# Patient Record
Sex: FEMALE | Race: WHITE | Employment: UNEMPLOYED | URBAN - METROPOLITAN AREA
[De-identification: names, ages, dates, MRNs, and addresses within clinical notes are randomized per-mention and may not be internally consistent; named-entity substitution may affect disease eponyms.]

---

## 2018-12-27 ENCOUNTER — APPOINTMENT (OUTPATIENT)
Dept: GENERAL RADIOLOGY | Age: 23
End: 2018-12-27
Attending: EMERGENCY MEDICINE
Payer: OTHER MISCELLANEOUS

## 2018-12-27 ENCOUNTER — HOSPITAL ENCOUNTER (EMERGENCY)
Age: 23
Discharge: HOME | End: 2018-12-27
Attending: EMERGENCY MEDICINE
Payer: OTHER MISCELLANEOUS

## 2018-12-27 VITALS
HEIGHT: 66 IN | SYSTOLIC BLOOD PRESSURE: 136 MMHG | BODY MASS INDEX: 25.71 KG/M2 | WEIGHT: 160 LBS | DIASTOLIC BLOOD PRESSURE: 72 MMHG | TEMPERATURE: 98 F | HEART RATE: 78 BPM | OXYGEN SATURATION: 99 % | RESPIRATION RATE: 18 BRPM

## 2018-12-27 DIAGNOSIS — S50.02XA CONTUSION OF LEFT ELBOW, INITIAL ENCOUNTER: ICD-10-CM

## 2018-12-27 DIAGNOSIS — S76.219A GROIN STRAIN, INITIAL ENCOUNTER: ICD-10-CM
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
## 2018-12-27 DIAGNOSIS — S60.212A CONTUSION OF LEFT WRIST, INITIAL ENCOUNTER: ICD-10-CM

## 2018-12-27 PROCEDURE — 73110 X-RAY EXAM OF WRIST: CPT

## 2018-12-27 PROCEDURE — 73080 X-RAY EXAM OF ELBOW: CPT

## 2018-12-27 PROCEDURE — 99282 EMERGENCY DEPT VISIT SF MDM: CPT

## 2018-12-27 PROCEDURE — 74011250637 HC RX REV CODE- 250/637: Performed by: PHYSICIAN ASSISTANT

## 2018-12-27 RX ORDER — METHOCARBAMOL 500 MG/1
500 TABLET, FILM COATED ORAL 4 TIMES DAILY
Qty: 30 TAB | Refills: 0 | Status: SHIPPED | OUTPATIENT
Start: 2018-12-27 | End: 2019-03-07

## 2018-12-27 RX ORDER — METHOCARBAMOL 500 MG/1
500 TABLET, FILM COATED ORAL
Status: COMPLETED | OUTPATIENT
Start: 2018-12-27 | End: 2018-12-27

## 2018-12-27 RX ADMIN — METHOCARBAMOL 500 MG: 500 TABLET, FILM COATED ORAL at 16:52
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
## 2018-12-27 NOTE — LETTER
[address] [place] [address] [facility] 
[address] [facility] [id number] [phone] Work/School Note Date: 12/27/2018 To Whom It May concern: 
 
Gwendel Spatz was seen and treated today in the emergency room by the following provider(s): 
Attending Provider: Debbi Hall MD 
Physician Assistant: Chato Gongora. Gwendel Spatz may return to work on 12/28/18. Sincerely, CHAYO Hdez
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
## 2018-12-27 NOTE — DISCHARGE INSTRUCTIONS
Groin Strain: Care Instructions  Your Care Instructions  A groin strain is an injury that happens when you tear or overstretch (pull) a groin muscle. The groin muscles are in the area on either side of the body in the folds where the belly joins the legs. You can strain a groin muscle during exercise, such as running, skating, kicking in soccer, or playing basketball. It can happen when you lift, push, or pull heavy objects. You might pull a groin muscle when you fall. The injury can range from a minor pull to a more serious tear of the muscle. You may feel pain and tenderness that's worse when you squeeze your legs together. You may also have pain when you raise the knee of the injured side. There may be swelling or bruising in the groin area or inner thigh. If you have a bad strain, you may walk with a limp while it heals. Rest and other home care can help the muscle heal. Healing can take up to 3 weeks or more. Your doctor may want to see you again in 2 to 3 weeks. Follow-up care is a key part of your treatment and safety. Be sure to make and go to all appointments, and call your doctor if you are having problems. It's also a good idea to know your test results and keep a list of the medicines you take. How can you care for yourself at home? · Be safe with medicines. Read and follow all instructions on the label. ? If the doctor gave you a prescription medicine for pain, take it as prescribed. ? If you are not taking a prescription pain medicine, ask your doctor if you can take an over-the-counter medicine. · Rest and protect your injured or sore groin area for 1 to 2 weeks. Stop, change, or take a break from any activity that may be causing your pain or soreness. Do not do intense activities while you still have pain. · Put ice or a cold pack on your groin area for 10 to 20 minutes at a time. Try to do this every 1 to 2 hours for the next 3 days (when you are awake) or until the swelling goes down. Put a thin cloth between the ice and your skin. · After 2 or 3 days, if your swelling is gone, apply heat. Put a warm water bottle, a heating pad set on low, or a warm cloth on your groin area. Do not go to sleep with a heating pad on your skin. · If your doctor gave you crutches, make sure you use them as directed. · Wear snug shorts or underwear that support the injured area. When should you call for help? Call your doctor now or seek immediate medical care if:    · You have new or severe pain or swelling in the groin area.     · Your groin or upper thigh is cool or pale or changes color.     · You have tingling, weakness, or numbness in your groin or leg.     · You cannot move your leg.     · You cannot put weight on your leg.    Watch closely for changes in your health, and be sure to contact your doctor if:    · You do not get better as expected. Where can you learn more? Go to http://eloise-ant.info/. Enter W279 in the search box to learn more about \"Groin Strain: Care Instructions. \"  Current as of: November 29, 2017  Content Version: 11.8  © 3046-6059 [employer]. Care instructions adapted under license by [employer] (which disclaims liability or warranty for this information). If you have questions about a medical condition or this instruction, always ask your healthcare professional. [place] [address] any warranty or liability for your use of this information. Motor Vehicle Accident: Care Instructions  Your Care Instructions    You were seen by a doctor after a motor vehicle accident. Because of the accident, you may be sore for several days. Over the next few days, you may hurt more than you did just after the accident. The doctor has checked you carefully, but problems can develop later. If you notice any problems or new symptoms, get medical treatment right away. Follow-up care is a key part of your treatment and safety. Be sure to make and go to all appointments, and call your doctor if you are having problems. It's also a good idea to know your test results and keep a list of the medicines you take. How can you care for yourself at home? · Keep track of any new symptoms or changes in your symptoms. · Take it easy for the next few days, or longer if you are not feeling well. Do not try to do too much. · Put ice or a cold pack on any sore areas for 10 to 20 minutes at a time to stop swelling. Put a thin cloth between the ice pack and your skin. Do this several times a day for the first 2 days. · Be safe with medicines. Take pain medicines exactly as directed. ? If the doctor gave you a prescription medicine for pain, take it as prescribed. ? If you are not taking a prescription pain medicine, ask your doctor if you can take an over-the-counter medicine. · Do not drive after taking a prescription pain medicine. · Do not do anything that makes the pain worse. · Do not drink any alcohol for 24 hours or until your doctor tells you it is okay. When should you call for help? Call 911 if:    · You passed out (lost consciousness).    Call your doctor now or seek immediate medical care if:    · You have new or worse belly pain.     · You have new or worse trouble breathing.     · You have new or worse head pain.     · You have new pain, or your pain gets worse.     · You have new symptoms, such as numbness or vomiting.    Watch closely for changes in your health, and be sure to contact your doctor if:    · You are not getting better as expected. Where can you learn more? Go to http://eloise-ant.info/. Enter K570 in the search box to learn more about \"Motor Vehicle Accident: Care Instructions. \"  Current as of: November 20, 2017  Content Version: 11.8  © 9985-0228 [employer].  Care instructions adapted under license by [facility] (which disclaims liability or warranty for this information). If you have questions about a medical condition or this instruction, always ask your healthcare professional. [place] [address] any warranty or liability for your use of this information.

## 2019-02-08 ENCOUNTER — OFFICE VISIT (OUTPATIENT)
Dept: INTERNAL MEDICINE CLINIC | Age: 24
End: 2019-02-08

## 2019-02-08 VITALS
HEART RATE: 71 BPM | SYSTOLIC BLOOD PRESSURE: 123 MMHG | TEMPERATURE: 96.3 F | BODY MASS INDEX: 26.84 KG/M2 | RESPIRATION RATE: 18 BRPM | HEIGHT: 66 IN | OXYGEN SATURATION: 100 % | WEIGHT: 167 LBS | DIASTOLIC BLOOD PRESSURE: 77 MMHG

## 2019-02-08 DIAGNOSIS — M25.561 CHRONIC PAIN OF RIGHT KNEE: ICD-10-CM

## 2019-02-08 DIAGNOSIS — Z72.0 TOBACCO ABUSE: ICD-10-CM

## 2019-02-08 DIAGNOSIS — M79.602 LEFT ARM PAIN: Primary | ICD-10-CM

## 2019-02-08 DIAGNOSIS — Z00.00 ROUTINE GENERAL MEDICAL EXAMINATION AT A HEALTH CARE FACILITY: ICD-10-CM

## 2019-02-08 DIAGNOSIS — G89.29 CHRONIC PAIN OF RIGHT KNEE: ICD-10-CM

## 2019-02-08 DIAGNOSIS — R61 HYPERHIDROSIS: ICD-10-CM

## 2019-02-08 DIAGNOSIS — Z76.89 ESTABLISHING CARE WITH NEW DOCTOR, ENCOUNTER FOR: ICD-10-CM
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
## 2019-02-08 NOTE — PATIENT INSTRUCTIONS
Smoking cessation- Resources:  
1. 5-704-[id number] 2. Local [facility][1] Clinics (check with your insurance)- they may offer free classes 7 days a week with no appointment necessary 3. American [employer] Association [place] From Smoking 4. Talk with your provider about medication. Tennis Elbow: Care Instructions Your Care Instructions Tennis elbow is soreness or pain on the outer part of the elbow. The pain occurs when the tendon is stretched and becomes irritated by repeated twisting of the hand, wrist, and forearm. A tendon is a tough tissue that connects muscle to bone. This injury is common in tennis players. But you also can get it from many activities that work the same muscles. Examples include gardening, painting, and using tools. Tennis elbow usually heals with rest and treatment at home. Follow-up care is a key part of your treatment and safety. Be sure to make and go to all appointments, and call your doctor if you are having problems. It's also a good idea to know your test results and keep a list of the medicines you take. How can you care for yourself at home? 
  · Rest your fingers, wrist, and forearm. Try to stop or reduce any activity that causes elbow pain. You may have to rest your arm for weeks to months. Follow your doctor's directions for how long to rest.  
  · Put ice or a cold pack on your elbow for 10 to 20 minutes at a time. Try to do this every 1 to 2 hours for the next 3 days (when you are awake) or until the swelling goes down. Put a thin cloth between the ice and your skin.  
  · If your doctor gave you a brace or splint, use it as directed. A \"counterforce\" brace is a strap around your forearm, just below your elbow. It may ease the pressure on the tendon and spread force throughout your arm.  
  · Prop up your elbow on pillows to help reduce swelling.  
  · Follow your doctor's or physical therapist's directions for exercise.  
  · Return to your usual activities slowly.   · Try to prevent the problem. Learn the best techniques for your sport. For example, make sure the  on your tennis racquet is not too big for your hand. Try not to hit a tennis ball late in your swing.  
  · Think about asking your employer about new ways of doing your job if your elbow pain is caused by something you do at work. Medicines 
  · Be safe with medicines. Read and follow all instructions on the label. ? If the doctor gave you a prescription medicine for pain, take it as prescribed. ? If you are not taking a prescription pain medicine, ask your doctor if you can take an over-the-counter medicine. When should you call for help? Call your doctor now or seek immediate medical care if: 
  · Your pain is worse.  
  · You cannot bend your elbow normally.  
  · Your arm or hand is cool or pale or changes color.  
  · You have tingling, weakness, or numbness in your hand and fingers.  
 Watch closely for changes in your health, and be sure to contact your doctor if: 
  · You have work problems caused by your elbow pain.  
  · Your pain is not better after 2 weeks. Where can you learn more? Go to http://leoise-ant.info/. Enter 0699 465 17 25 in the search box to learn more about \"Tennis Elbow: Care Instructions. \" Current as of: September 20, 2018 Content Version: 11.9 © 4388-2306 [employer]. Care instructions adapted under license by [employer] (which disclaims liability or warranty for this information). If you have questions about a medical condition or this instruction, always ask your healthcare professional. [place] [address] any warranty or liability for your use of this information. Tennis Elbow: Care Instructions Your Care Instructions Tennis elbow is soreness or pain on the outer part of the elbow.  The pain occurs when the tendon is stretched and becomes irritated by repeated twisting of the hand, wrist, and forearm. A tendon is a tough tissue that connects muscle to bone. This injury is common in tennis players. But you also can get it from many activities that work the same muscles. Examples include gardening, painting, and using tools. Tennis elbow usually heals with rest and treatment at home. Follow-up care is a key part of your treatment and safety. Be sure to make and go to all appointments, and call your doctor if you are having problems. It's also a good idea to know your test results and keep a list of the medicines you take. How can you care for yourself at home? 
  · Rest your fingers, wrist, and forearm. Try to stop or reduce any activity that causes elbow pain. You may have to rest your arm for weeks to months. Follow your doctor's directions for how long to rest.  
  · Put ice or a cold pack on your elbow for 10 to 20 minutes at a time. Try to do this every 1 to 2 hours for the next 3 days (when you are awake) or until the swelling goes down. Put a thin cloth between the ice and your skin.  
  · If your doctor gave you a brace or splint, use it as directed. A \"counterforce\" brace is a strap around your forearm, just below your elbow. It may ease the pressure on the tendon and spread force throughout your arm.  
  · Prop up your elbow on pillows to help reduce swelling.  
  · Follow your doctor's or physical therapist's directions for exercise.  
  · Return to your usual activities slowly.  
  · Try to prevent the problem. Learn the best techniques for your sport. For example, make sure the  on your tennis racquet is not too big for your hand. Try not to hit a tennis ball late in your swing.  
  · Think about asking your employer about new ways of doing your job if your elbow pain is caused by something you do at work. Medicines 
  · Be safe with medicines. Read and follow all instructions on the label. ? If the doctor gave you a prescription medicine for pain, take it as prescribed. ? If you are not taking a prescription pain medicine, ask your doctor if you can take an over-the-counter medicine. When should you call for help? Call your doctor now or seek immediate medical care if: 
  · Your pain is worse.  
  · You cannot bend your elbow normally.  
  · Your arm or hand is cool or pale or changes color.  
  · You have tingling, weakness, or numbness in your hand and fingers.  
 Watch closely for changes in your health, and be sure to contact your doctor if: 
  · You have work problems caused by your elbow pain.  
  · Your pain is not better after 2 weeks. Where can you learn more? Go to http://eloise-ant.info/. Enter 0699 465 [id number] 25 in the search box to learn more about \"Tennis Elbow: Care Instructions. \" Current as of: September 20, 2018 Content Version: 11.9 © 0248-5505 [employer]. Care instructions adapted under license by [employer] (which disclaims liability or warranty for this information). If you have questions about a medical condition or this instruction, always ask your healthcare professional. [place] [address] any warranty or liability for your use of this information.

## 2019-02-09 LAB
ALBUMIN SERPL-MCNC: 4.6 G/DL (ref 3.5–5.5)
ALBUMIN/GLOB SERPL: 1.6 {RATIO} (ref 1.2–2.2)
ALP SERPL-CCNC: 90 IU/L (ref 39–117)
ALT SERPL-CCNC: 11 IU/L (ref 0–32)
APPEARANCE UR: CLEAR
AST SERPL-CCNC: 15 IU/L (ref 0–40)
BACTERIA #/AREA URNS HPF: ABNORMAL /[HPF]
BASOPHILS # BLD AUTO: 0 X10E3/UL (ref 0–0.2)
BASOPHILS NFR BLD AUTO: 1 %
BILIRUB SERPL-MCNC: 0.2 MG/DL (ref 0–1.2)
BILIRUB UR QL STRIP: NEGATIVE
BUN SERPL-MCNC: 10 MG/DL (ref 6–20)
BUN/CREAT SERPL: 16 (ref 9–23)
CALCIUM SERPL-MCNC: 9.3 MG/DL (ref 8.7–10.2)
CASTS URNS QL MICRO: ABNORMAL /LPF
CHLORIDE SERPL-SCNC: 105 MMOL/L (ref 96–106)
CO2 SERPL-SCNC: 22 MMOL/L (ref 20–29)
COLOR UR: YELLOW
CREAT SERPL-MCNC: 0.62 MG/DL (ref 0.57–1)
EOSINOPHIL # BLD AUTO: 0.4 X10E3/UL (ref 0–0.4)
EOSINOPHIL NFR BLD AUTO: 6 %
EPI CELLS #/AREA URNS HPF: ABNORMAL /HPF
ERYTHROCYTE [DISTWIDTH] IN BLOOD BY AUTOMATED COUNT: 13.5 % (ref 12.3–15.4)
GLOBULIN SER CALC-MCNC: 2.9 G/DL (ref 1.5–4.5)
GLUCOSE SERPL-MCNC: 88 MG/DL (ref 65–99)
GLUCOSE UR QL: NEGATIVE
HCT VFR BLD AUTO: 44.1 % (ref 34–46.6)
HGB BLD-MCNC: 14.4 G/DL (ref 11.1–15.9)
HGB UR QL STRIP: ABNORMAL
IMM GRANULOCYTES # BLD AUTO: 0 X10E3/UL (ref 0–0.1)
IMM GRANULOCYTES NFR BLD AUTO: 0 %
KETONES UR QL STRIP: NEGATIVE
LEUKOCYTE ESTERASE UR QL STRIP: NEGATIVE
LYMPHOCYTES # BLD AUTO: 2.3 X10E3/UL (ref 0.7–3.1)
LYMPHOCYTES NFR BLD AUTO: 36 %
MCH RBC QN AUTO: 29.8 PG (ref 26.6–33)
MCHC RBC AUTO-ENTMCNC: 32.7 G/DL (ref 31.5–35.7)
MCV RBC AUTO: 91 FL (ref 79–97)
MICRO URNS: ABNORMAL
MONOCYTES # BLD AUTO: 0.5 X10E3/UL (ref 0.1–0.9)
MONOCYTES NFR BLD AUTO: 8 %
NEUTROPHILS # BLD AUTO: 3.1 X10E3/UL (ref 1.4–7)
NEUTROPHILS NFR BLD AUTO: 49 %
NITRITE UR QL STRIP: NEGATIVE
PH UR STRIP: 7.5 [PH] (ref 5–7.5)
PLATELET # BLD AUTO: 332 X10E3/UL (ref 150–379)
POTASSIUM SERPL-SCNC: 4.8 MMOL/L (ref 3.5–5.2)
PROT SERPL-MCNC: 7.5 G/DL (ref 6–8.5)
PROT UR QL STRIP: NEGATIVE
RBC # BLD AUTO: 4.84 X10E6/UL (ref 3.77–5.28)
RBC #/AREA URNS HPF: ABNORMAL /HPF
SODIUM SERPL-SCNC: 144 MMOL/L (ref 134–144)
SP GR UR: 1.02 (ref 1–1.03)
TSH SERPL DL<=0.005 MIU/L-ACNC: 1.72 UIU/ML (ref 0.45–4.5)
UROBILINOGEN UR STRIP-MCNC: 0.2 MG/DL (ref 0.2–1)
WBC # BLD AUTO: 6.3 X10E3/UL (ref 3.4–10.8)
WBC #/AREA URNS HPF: ABNORMAL /HPF

## 2019-02-11 ENCOUNTER — TELEPHONE (OUTPATIENT)
Dept: INTERNAL MEDICINE CLINIC | Age: 24
End: 2019-02-11

## 2019-02-11 NOTE — TELEPHONE ENCOUNTER
Patient returned call and I advised her of the message below. Patient understood and provided the [facility] to [facility]. Closing referral to orthopedics.

## 2019-02-11 NOTE — TELEPHONE ENCOUNTER
Called patient to give results and instructions to take Naproxen BID for a couple of days. Patient states verbal understanding and agrees to hydrate.

## 2019-02-11 NOTE — TELEPHONE ENCOUNTER
Received call from [facility] they do not accept the patients insurance. Spoke with NP Rios Dinero and she feels that the patient she do the physical therapy first and then go from there. If the patient is persistent and wants to see a orthopedic than we can send to [place]. Tried to call patient and left a message for patient to return call.

## 2019-02-11 NOTE — TELEPHONE ENCOUNTER
----- Message from Shandra Everett NP sent at 2/11/2019 10:40 AM EST -----  Labs are normal except for trace blood and some bacteria in the urine however you were without urinary symptoms on examination so no need for treatment at this time just make sure to hydrate well.

## 2019-02-11 NOTE — PROGRESS NOTES
Labs are normal except for trace blood and some bacteria in the urine however you were without urinary symptoms on examination so no need for treatment at this time just make sure to hydrate well.

## 2019-02-13 ENCOUNTER — TELEPHONE (OUTPATIENT)
Dept: INTERNAL MEDICINE CLINIC | Age: 24
End: 2019-02-13

## 2019-02-13 NOTE — TELEPHONE ENCOUNTER
Patient called today 2/13/19 stating that she is now feeling burning sensation when she urinate.  Please advice [phone]

## 2019-02-14 ENCOUNTER — TELEPHONE (OUTPATIENT)
Dept: INTERNAL MEDICINE CLINIC | Age: 24
End: 2019-02-14

## 2019-02-14 NOTE — TELEPHONE ENCOUNTER
Patient contacted, patient identified with two identifiers (Name & ). Patient denies any dysuria, or hematuria. Patient given instructions per JOSE Sung, and verbalizes understanding.

## 2019-02-27 ENCOUNTER — HOSPITAL ENCOUNTER (OUTPATIENT)
Dept: PHYSICAL THERAPY | Age: 24
Discharge: HOME | End: 2019-02-27
Payer: MEDICAID

## 2019-02-27 PROCEDURE — 97162 PT EVAL MOD COMPLEX 30 MIN: CPT

## 2019-02-27 PROCEDURE — 97140 MANUAL THERAPY 1/> REGIONS: CPT

## 2019-02-27 PROCEDURE — 97110 THERAPEUTIC EXERCISES: CPT
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
## 2019-02-27 NOTE — PROGRESS NOTES
PT DAILY TREATMENT NOTE 10-18    Patient Name: Popeye Patient Hand  Date:2019  : 1995  [x]  Patient  Verified  Payor: BLUE CROSS MEDICAID / Plan: [address] [place] [address] / Product Type: Managed Care Medicaid /    In time:2:45  Out time:3:30  Total Treatment Time (min): 45  Visit #: 1 of 10      Medicare/BCBS Only   Total Timed Codes (min):  23 1:1 Treatment Time:  45       Treatment Area: Pain in right knee [M25.561]  Pain in left arm [M79.602]     SUBJECTIVE  Pain Level (0-10 scale): 1-2/10  Any medication changes, allergies to medications, adverse drug reactions, diagnosis change, or new procedure performed?: [x] No    [] Yes (see summary sheet for update)  Subjective functional status/changes:   [x] See paper initial evaluation form in patient chart.       OBJECTIVE    22 min [x]Eval                  []Re-Eval     13 min Therapeutic Exercise:  [] See flow sheet : HEP given and reviewed with Pt; postural re-education   Rationale: increase ROM and increase strength to improve the patients ability to improve left elbow pain with tasks; reduce pain with standing/amb    10 min Manual Therapy:  MET leg pull to correct right ilial upslip; MET to correct right posteriorly rotated innominate   Rationale: decrease pain and increase ROM to reduce pain with amb    With   [x] TE   [] TA   [] neuro   [] other: Patient Education: [x] Review HEP    [] Progressed/Changed HEP based on:   [] positioning   [] body mechanics   [] transfers   [] heat/ice application    [] other:      Other Objective/Functional Measures: FOTO 56 pts     Pain Level (0-10 scale) post treatment: 1/10    ASSESSMENT/Changes in Function:     Patient will continue to benefit from skilled PT services to address functional mobility deficits, address ROM deficits, address strength deficits, analyze and address soft tissue restrictions, analyze and cue movement patterns, analyze and modify body mechanics/ergonomics, assess and modify postural abnormalities, address imbalance/dizziness and instruct in home and community integration to attain remaining goals.      [x]  See Plan of Care  []  See progress note/recertification  []  See Discharge Summary         PLAN  []  Upgrade activities as tolerated     [x]  Continue plan of care  []  Update interventions per flow sheet       []  Discharge due to:_  []  Other:_      Josefina Goyal, PT 2/27/2019  5:27 PM

## 2019-03-01 ENCOUNTER — HOSPITAL ENCOUNTER (OUTPATIENT)
Dept: PHYSICAL THERAPY | Age: 24
Discharge: HOME | End: 2019-03-01
Payer: MEDICAID

## 2019-03-01 PROCEDURE — 97035 APP MDLTY 1+ULTRASOUND EA 15: CPT

## 2019-03-01 PROCEDURE — 97140 MANUAL THERAPY 1/> REGIONS: CPT

## 2019-03-01 PROCEDURE — 97110 THERAPEUTIC EXERCISES: CPT

## 2019-03-01 NOTE — PROGRESS NOTES
PT DAILY TREATMENT NOTE 10-18    Patient Name: Sohail Bansal  Date:3/1/2019  : 1995  [x]  Patient  Verified  Payor: BLUE CROSS MEDICAID / Plan: [facility]leg[facility] / Product Type: Managed Care Medicaid /    In time:11:24  Out time:12:32  Total Treatment Time (min): 68  Visit #: 2 of 10    Medicare/BCBS Only   Total Timed Codes (min):  58 1:1 Treatment Time:  62       Treatment Area: Pain in right knee [M25.561]  Pain in left arm [M79.602]    SUBJECTIVE  Pain Level (0-10 scale): 0 \"sore\" in left elbow and right knee  Any medication changes, allergies to medications, adverse drug reactions, diagnosis change, or new procedure performed?: [x] No    [] Yes (see summary sheet for update)  Subjective functional status/changes:   [] No changes reported  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 Pt reports soreness in the left elbow and right knee today. Pt states that the knee felt better after the manual last session.      OBJECTIVE    Modality rationale: decrease inflammation and decrease pain to improve the patients ability to tolerate ADLs   Min Type Additional Details    [] Estim:  []Unatt       []IFC  []Premod                        []Other:  []w/ice   []w/heat  Position:  Location:    [] Estim: []Att    []TENS instruct  []NMES                    []Other:  []w/US   []w/ice   []w/heat  Position:  Location:    []  Traction: [] Cervical       []Lumbar                       [] Prone          []Supine                       []Intermittent   []Continuous Lbs:  [] before manual  [] after manual   10 (5 mins on right knee and 5 mins on left elbow) [x]  Ultrasound: []Continuous   [x] Pulsed                           []1MHz   [x]3MHz  W/cm2: 1.0  Location: right knee and left elbow    []  Iontophoresis with dexamethasone         Location: [] Take home patch   [] In clinic   10 [x]  Ice     []  heat  []  Ice massage  []  Laser []  Anodyne Position: supine  Location: right knee    []  Laser with stim  []  Other:  Position:  Location:    []  Vasopneumatic Device Pressure:       [] lo [] med [] hi   Temperature: [] lo [] med [] hi   [] Skin assessment post-treatment:  []intact []redness- no adverse reaction    []redness - adverse reaction:     33 min Therapeutic Exercise:  [x] See flow sheet :   Rationale: increase ROM and increase strength to improve the patients ability to tolerate ADLs    15 min Manual Therapy:  MET leg pull to correct right innominate upslip; left elbow distraction, left elbow distraction with anterior and posterior grade 1-2 glides   Rationale: decrease pain, increase ROM and increase tissue extensibility to improve activity tolerance          With   [] TE   [] TA   [] neuro   [] other: Patient Education: [x] Review HEP    [] Progressed/Changed HEP based on:   [] positioning   [] body mechanics   [] transfers   [] heat/ice application    [] other:      Other Objective/Functional Measures: Initiated exercises/interventions in flow sheet. Pelvic alignment and leg length WNL. Pain Level (0-10 scale) post treatment: 1 right knee, 3 left medial/proximal forearm    ASSESSMENT/Changes in Function: Reported pain in the right patellar fat pad region with 1 rep of the right hip IR part of the clamsx3 exercise with the green tband. Held the rest of this exercise on the right. Pt reported some improvement in this pain post session. Pt denied ice on the left elbow but stated she felt some pain in the left medial/proximal forearm post session. Educated pt that she can try ice at home to help with the pain and symptoms for 15-20 mins. Educated pt to be mindful of her posture and positioning while performing work activities/driving. Educated pt that she can roll up a towel to use as a lumbar roll while driving to improve posture. Continue POC as tolerated.      Patient will continue to benefit from skilled PT services to modify and progress therapeutic interventions, address functional mobility deficits, address ROM deficits, address strength deficits, analyze and address soft tissue restrictions, analyze and cue movement patterns, analyze and modify body mechanics/ergonomics, address imbalance/dizziness and instruct in home and community integration to attain remaining goals. []  See Plan of Care  []  See progress note/recertification  []  See Discharge Summary         Progress towards goals / Updated goals:  Short Term Goals: To be accomplished in 1 weeks:  Goal: Pt to be compliant with initial HEP to improve left forearm symptoms and right LE strength. Status at last note/certification: Established and reviewed with Pt  Current: reports no problems with the HEP. 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 Status at last note/certification: 4/5 grossly  Goal: Pt to report < 5/10 pain at worst in right knee to work full work shifts without increased difficulty. Status at last note/certification: 8/90 pain at worst  Goal: Pt to report only 1-2 episodes of right knee locking with change in positions weekly to reduce pain with gait. Status at last note/certification: locking multiple times daily  Goal: Pt to report FOTO score of 71 pts to show improved function and quality of life.   Status at last note/certification: FOTO 56 pts    PLAN  [x]  Upgrade activities as tolerated     [x]  Continue plan of care  [x]  Update interventions per flow sheet       []  Discharge due to:_  []  Other:_      Ivan Montero, PT 3/1/2019  11:50 AM    Future Appointments   Date Time Provider Lucita Chen   3/1/2019 11:15 AM Sherman Clark, PT [facility] [address]   3/5/2019 12:00 PM Yamileth Magana [address]   3/8/2019 11:15 AM evon [facility]N [address]   3/12/2019 11:15 AM [facility] KEENAN [address]   3/15/2019 11:15 AM [facility] KEENAN [address]   3/19/2019 10:30 AM [facility] KEENAN [address]   3/22/2019 11:15 AM Jonathan Goyal, [facility] REE [address]   3/26/2019 11:15 AM evon [facility] K[facility] [address]   3/29/2019 11:15 AM [place], [address]

## 2019-03-04 ENCOUNTER — TELEPHONE (OUTPATIENT)
Dept: INTERNAL MEDICINE CLINIC | Age: 24
End: 2019-03-04

## 2019-03-04 NOTE — TELEPHONE ENCOUNTER
Patient calling and stated that she took benadryl last night thinking it was allergies, however when she woke up this morning she has a stuffy nose and a swollen throat. Patient denies chills and unable to check if she has a fever due to no thermometer. Patient denies trying any other medications besides benadryl. Advised patient I recommend trying some cold medication over the counter, due to we do not have any open appointments and if she still doesn't feel good in a few days I recommend going to either an urgent care or to the emergency room. Patient understood.

## 2019-03-05 ENCOUNTER — HOSPITAL ENCOUNTER (OUTPATIENT)
Dept: PHYSICAL THERAPY | Age: 24
Discharge: HOME | End: 2019-03-05
Payer: MEDICAID

## 2019-03-05 PROCEDURE — 97035 APP MDLTY 1+ULTRASOUND EA 15: CPT

## 2019-03-05 PROCEDURE — 97110 THERAPEUTIC EXERCISES: CPT

## 2019-03-05 PROCEDURE — 97140 MANUAL THERAPY 1/> REGIONS: CPT

## 2019-03-05 NOTE — PROGRESS NOTES
PT DAILY TREATMENT NOTE 10-18    Patient Name: Venessa Salazar  Date:3/5/2019  : 1995  [x]  Patient  Verified  Payor: BLUE CROSS MEDICAID / Plan: [facility] [employer] / Product Type: Managed Care Medicaid /    In time:12;05  Out time: 12;55  Total Treatment Time (min): 50  Visit #: 3 of 10    Medicare/BCBS Only   Total Timed Codes (min):  50 1:1 Treatment Time:  50       Treatment Area: Pain in right knee [M25.561]  Pain in left arm [M79.602]    SUBJECTIVE  Pain Level (0-10 scale):  0  Any medication changes, allergies to medications, adverse drug reactions, diagnosis change, or new procedure performed?: [x] No    [] Yes (see summary sheet for update)  Subjective functional status/changes:   [] No changes reported  The pain hasn't been constant since starting PT    OBJECTIVE    Modality rationale: decrease inflammation, decrease pain and increase tissue extensibility to improve the patients ability to improve ease of walking, UE function for carrying   Min Type Additional Details    [] Estim:  []Unatt       []IFC  []Premod                        []Other:  []w/ice   []w/heat  Position:  Location:    [] Estim: []Att    []TENS instruct  []NMES                    []Other:  []w/US   []w/ice   []w/heat  Position:  Location:    []  Traction: [] Cervical       []Lumbar                       [] Prone          []Supine                       []Intermittent   []Continuous Lbs:  [] before manual  [] after manual   5minutes x 2 [x]  Ultrasound: []Continuous   [x] Pulsed                           []1MHz   [x]3MHz W/cm2: 1.0  Location: left medial elbow,.  Right knee (fat pad)    []  Iontophoresis with dexamethasone         Location: [] Take home patch   [] In clinic    []  Ice     []  heat  []  Ice massage  []  Laser   []  Anodyne Position:  Location:    []  Laser with stim  []  Other:  Position:  Location:    []  Vasopneumatic Device Pressure:       [] lo [] med [] hi   Temperature: [] lo [] med [] hi   [x] Skin assessment post-treatment:  [x]intact []redness- no adverse reaction    []redness - adverse reaction:         25 min Therapeutic Exercise:  [] See flow sheet :   Rationale: increase ROM and increase strength to improve the patients ability to improve ability to perform daily and job tasks    15 min Manual Therapy:  STM, TPR to wrist flexors, distal triceps   Rationale: decrease pain, increase tissue extensibility and decrease trigger points to improve ease of carrying, ADL's, reduce tingling       With   - TE   - TA   - neuro   - other: Patient Education: - Review HEP    - Progressed/Changed HEP based on:   - positioning   - body mechanics   - transfers   - heat/ice application    - other:      Other Objective/Functional Measures: Added prone UE   PD ice    Pain Level (0-10 scale) post treatment: 0    ASSESSMENT/Changes in Function: palpable taut bands throughout the wrist flexors. Pt reporting no pain or symptoms in wrists, forearm aftert MT    Patient will continue to benefit from skilled PT services to modify and progress therapeutic interventions, address functional mobility deficits, address ROM deficits, address strength deficits, analyze and address soft tissue restrictions, analyze and cue movement patterns, analyze and modify body mechanics/ergonomics, assess and modify postural abnormalities, address imbalance/dizziness and instruct in home and community integration to attain remaining goals. []  See Plan of Care  []  See progress note/recertification  []  See Discharge Summary         Progress towards goals / Updated goals:  Goal: Pt to be compliant with initial HEP to improve left forearm symptoms and right LE strength. Status at last note/certification: Established and reviewed with Pt  Current: reports no problems with the HEP.    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 Status at last note/certification: limited a lot in performing per Pt report  Goal: Pt to increase right hip strength to 5/5 grossly to negotiate stairs without increased difficulty or pain for ease with job duties. Status at last note/certification: 4/5 grossly  Goal: Pt to report < 5/10 pain at worst in right knee to work full work shifts without increased difficulty. Status at last note/certification: 7/46 pain at worst  Goal: Pt to report only 1-2 episodes of right knee locking with change in positions weekly to reduce pain with gait. Status at last note/certification: locking multiple times daily  Goal: Pt to report FOTO score of 71 pts to show improved function and quality of life.   Status at last note/certification: FOTO 56 pts    PLAN  [x]  Upgrade activities as tolerated     []  Continue plan of care  []  Update interventions per flow sheet       []  Discharge due to:_  []  Other:_      Andrade Ramon, PTA 3/5/2019  12:00 PM    Future Appointments   Date Time Provider Lucita Chen   3/8/2019 11:15 AM Selam Monet [facility]   3/12/2019 11:15 AM [place], [address]   3/15/2019 11:15 AM [place], [address] [place] [address]   3/19/2019 10:30 AM [facility] KEENAN [facility]   3/22/2019 11:15 AM Ajay Goyal [facility] KEENAN [facility]   3/26/2019 11:15 AM [facility] REE [facility]   3/29/2019 11:15 AM [place], [address] [place] [address]

## 2019-03-07 ENCOUNTER — HOSPITAL ENCOUNTER (EMERGENCY)
Age: 24
Discharge: HOME | End: 2019-03-07
Attending: EMERGENCY MEDICINE | Admitting: EMERGENCY MEDICINE
Payer: MEDICAID

## 2019-03-07 VITALS
WEIGHT: 165 LBS | BODY MASS INDEX: 26.52 KG/M2 | TEMPERATURE: 98.6 F | HEART RATE: 87 BPM | DIASTOLIC BLOOD PRESSURE: 86 MMHG | RESPIRATION RATE: 16 BRPM | SYSTOLIC BLOOD PRESSURE: 135 MMHG | OXYGEN SATURATION: 100 % | HEIGHT: 66 IN

## 2019-03-07 DIAGNOSIS — R10.2 VAGINAL PAIN: Primary | ICD-10-CM

## 2019-03-07 LAB
APPEARANCE UR: CLEAR
BILIRUB UR QL: NEGATIVE
COLOR UR: YELLOW
EPITH CASTS URNS QL MICRO: NORMAL /LPF (ref 0–5)
GLUCOSE UR STRIP.AUTO-MCNC: NEGATIVE MG/DL
HCG UR QL: NEGATIVE
HGB UR QL STRIP: ABNORMAL
KETONES UR QL STRIP.AUTO: NEGATIVE MG/DL
LEUKOCYTE ESTERASE UR QL STRIP.AUTO: ABNORMAL
NITRITE UR QL STRIP.AUTO: NEGATIVE
PH UR STRIP: 7 [PH] (ref 5–8)
PROT UR STRIP-MCNC: NEGATIVE MG/DL
RBC #/AREA URNS HPF: NORMAL /HPF (ref 0–5)
SP GR UR REFRACTOMETRY: 1.02 (ref 1–1.03)
UROBILINOGEN UR QL STRIP.AUTO: 0.2 EU/DL (ref 0.2–1)
WBC URNS QL MICRO: NORMAL /HPF (ref 0–4)

## 2019-03-07 PROCEDURE — 81025 URINE PREGNANCY TEST: CPT

## 2019-03-07 PROCEDURE — 81001 URINALYSIS AUTO W/SCOPE: CPT

## 2019-03-07 PROCEDURE — 99284 EMERGENCY DEPT VISIT MOD MDM: CPT

## 2019-03-07 NOTE — DISCHARGE INSTRUCTIONS
Patient Education        Pelvic Pain: Care Instructions  Your Care Instructions    Pelvic pain, or pain in the lower belly, can have many causes. Often pelvic pain is not serious and gets better in a few days. If your pain continues or gets worse, you may need tests and treatment. Tell your doctor about any new symptoms. These may be signs of a serious problem. Follow-up care is a key part of your treatment and safety. Be sure to make and go to all appointments, and call your doctor if you are having problems. It's also a good idea to know your test results and keep a list of the medicines you take. How can you care for yourself at home? · Rest until you feel better. Lie down, and raise your legs by placing a pillow under your knees. · Drink plenty of fluids. You may find that small, frequent sips are easier on your stomach than if you drink a lot at once. Avoid drinks with carbonation or caffeine, such as soda pop, tea, or coffee. · Try eating several small meals instead of 2 or 3 large ones. Eat mild foods, such as rice, dry toast or crackers, bananas, and applesauce. Avoid fatty and spicy foods, other fruits, and alcohol until 48 hours after your symptoms have gone away. · Take an over-the-counter pain medicine, such as acetaminophen (Tylenol), ibuprofen (Advil, Motrin), or naproxen (Aleve). Read and follow all instructions on the label. · Do not take two or more pain medicines at the same time unless the doctor told you to. Many pain medicines have acetaminophen, which is Tylenol. Too much acetaminophen (Tylenol) can be harmful. · You can put a heating pad, a warm cloth, or moist heat on your belly to relieve pain. When should you call for help? Call your doctor now or seek immediate medical care if:    · You have a new or higher fever.     · You have unusual vaginal bleeding.     · You have new or worse belly or pelvic pain.     · You have vaginal discharge that has increased in amount or smells bad.  Watch closely for changes in your health, and be sure to contact your doctor if:    · You do not get better as expected. Where can you learn more? Go to http://eloise-ant.info/. Enter [phone] in the search box to learn more about \"Pelvic Pain: Care Instructions. \"  Current as of: May 14, 2018  Content Version: 11.9  © 7276-4013 [employer]. Care instructions adapted under license by [employer] (which disclaims liability or warranty for this information). If you have questions about a medical condition or this instruction, always ask your healthcare professional. [address] any warranty or liability for your use of this information.

## 2019-03-07 NOTE — ED PROVIDER NOTES
EMERGENCY DEPARTMENT HISTORY AND PHYSICAL EXAM    Date: 3/7/2019  Patient Name: Vazquez Spence    History of Presenting Illness     Chief Complaint   Patient presents with    Menstrual Problem         History Provided By: Patient    Chief Complaint: Pelvic cramping and concern for retained FB in vagina   Duration: Today   Timing:  Acute  Location: pelvis and vagina   Quality: Cramping  Severity: 7 out of 10  Modifying Factors: None   Associated Symptoms: none       Additional History (Context): Vazquez Spence is a 27 y.o. female with a history of asthma who presents with concerns for retained FB in vagina. Reports she uses both cups and tampons. Reports she is currently on her menstrual cycle and has had cramps that are mildly out of the norm for her. Reports she thought she only had a cup in, but once feeling the cramps became concerned she may have left a tampon in. Reports she tried to remove any FB herself but was unable to find anything. Denies concerns for vaginal discharge, pregnancy or STI. PCP: García Gross NP        Past History     Past Medical History:  Past Medical History:   Diagnosis Date    Asthma     Knee sprain     Right knee       Past Surgical History:  History reviewed. No pertinent surgical history. Family History:  Family History   Problem Relation Age of Onset    Obesity Mother     Obesity Father     No Known Problems Brother     Obesity Maternal Grandmother     Arthritis-rheumatoid Maternal Grandmother        Social History:  Social History     Tobacco Use    Smoking status: Current Every Day Smoker     Packs/day: 1.00    Smokeless tobacco: Never Used   Substance Use Topics    Alcohol use: Yes     Alcohol/week: 1.8 oz     Types: 3 Glasses of wine per week    Drug use: Yes     Types: Marijuana     Comment: on occasion       Allergies:  No Known Allergies      Review of Systems   Review of Systems   Constitutional: Negative for chills and fever.    HENT: Negative for congestion, rhinorrhea and sore throat. Respiratory: Negative for cough and shortness of breath. Cardiovascular: Negative for chest pain. Gastrointestinal: Negative for abdominal pain, blood in stool, constipation, diarrhea, nausea and vomiting. Genitourinary: Positive for pelvic pain, vaginal bleeding and vaginal pain. Negative for decreased urine volume, dysuria, frequency, hematuria, urgency and vaginal discharge. Musculoskeletal: Negative for back pain and myalgias. Skin: Negative for rash and wound. Neurological: Negative for dizziness and headaches. All other systems reviewed and are negative. All Other Systems Negative  Physical Exam     Vitals:    03/07/19 1743   BP: 135/86   Pulse: 87   Resp: 16   Temp: 98.6 °F (37 °C)   SpO2: 100%   Weight: 74.8 kg (165 lb)   Height: 5' 6\" (1.676 m)     Physical Exam   Constitutional: She is oriented to person, place, and time. She appears well-developed and well-nourished. No distress. HENT:   Head: Normocephalic and atraumatic. Eyes: Conjunctivae are normal.   Neck: Normal range of motion. Neck supple. Cardiovascular: Normal rate, regular rhythm and normal heart sounds. Pulmonary/Chest: Effort normal and breath sounds normal. No respiratory distress. She exhibits no tenderness. Abdominal: Soft. Bowel sounds are normal. She exhibits no distension. There is no tenderness. There is no rebound and no guarding. Genitourinary: There is no tenderness or lesion on the right labia. There is no tenderness or lesion on the left labia. No erythema, tenderness or bleeding in the vagina. No foreign body in the vagina. No signs of injury around the vagina. Vaginal discharge (vaginal bleeding) found. Genitourinary Comments: No evidence of FB. Musculoskeletal: She exhibits no edema or deformity. Neurological: She is alert and oriented to person, place, and time. Skin: Skin is warm and dry. She is not diaphoretic.    Psychiatric: She has a normal mood and affect. Her behavior is normal.   Nursing note and vitals reviewed. Diagnostic Study Results     Labs -     Recent Results (from the past 12 hour(s))   URINALYSIS W/ RFLX MICROSCOPIC    Collection Time: 19  5:55 PM   Result Value Ref Range    Color YELLOW      Appearance CLEAR      Specific gravity 1.016 1.005 - 1.030      pH (UA) 7.0 5.0 - 8.0      Protein NEGATIVE  NEG mg/dL    Glucose NEGATIVE  NEG mg/dL    Ketone NEGATIVE  NEG mg/dL    Bilirubin NEGATIVE  NEG      Blood MODERATE (A) NEG      Urobilinogen 0.2 0.2 - 1.0 EU/dL    Nitrites NEGATIVE  NEG      Leukocyte Esterase TRACE (A) NEG     HCG URINE, QL    Collection Time: 19  5:55 PM   Result Value Ref Range    HCG urine, QL NEGATIVE  NEG     URINE MICROSCOPIC ONLY    Collection Time: 19  5:55 PM   Result Value Ref Range    WBC 0 to 3 0 - 4 /hpf    RBC 4 to 10 0 - 5 /hpf    Epithelial cells 2+ 0 - 5 /lpf       Radiologic Studies -   No orders to display     CT Results  (Last 48 hours)    None        CXR Results  (Last 48 hours)    None            Medical Decision Making   I am the first provider for this patient. I reviewed the vital signs, available nursing notes, past medical history, past surgical history, family history and social history. Vital Signs-Reviewed the patient's vital signs. Pulse Oximetry Analysis -  100 % RA    Records Reviewed: Nursing Notes and Old Medical Records     Procedures: None   Procedures    Provider Notes (Medical Decision Making):     Differential Diagnosis:  Menses, AUB/DUB, , normal pregnancy, Retained FB, UTI     Plan: Will order ua, urine preg, and will do pelvic exam to evaluate for FB. MED RECONCILIATION:  No current facility-administered medications for this encounter. No current outpatient medications on file. Disposition:  Home     DISCHARGE NOTE:   Pt has been reexamined. Patient has no new complaints, changes, or physical findings.   Care plan outlined and precautions discussed. Results of workup were reviewed with the patient. All medications were reviewed with the patient. All of pt's questions and concerns were addressed. Patient was instructed and agrees to follow up with OBGYN, as well as to return to the ED upon further deterioration. Patient is ready to go home. Follow-up Information     Follow up With Specialties Details Why Contact Info    [facility] EMERGENCY DEPT Emergency Medicine  As needed [address] [address]  [place] [address]  In 2 days  [address] [facility]  [phone]          There are no discharge medications for this patient. Diagnosis     Clinical Impression:   1.  Vaginal pain

## 2019-03-07 NOTE — ED TRIAGE NOTES
Patient voices concerns for possible tampon retained in vagina. She states that she is on her menstrual cycle at present.

## 2019-03-07 NOTE — LETTER
[address] [place] [address] Dr [facility] EMERGENCY DEPT 
[address] [facility] [address] [phone] Work/School Note Date: 3/7/2019 To Whom It May concern: 
 
Torsten Landin was seen and treated today in the emergency room by the following provider(s): 
Attending Provider: Angelina Jiang DO Physician Assistant: CHAYO Meehan. Torsten Landin  May return to work on 3/8/19 Sincerely, Jade Eng, [address]

## 2019-03-07 NOTE — ED NOTES
Venessa Salazar is a 27 y.o. female that was discharged in stable condition. The patients diagnosis, condition and treatment were explained to  patient and aftercare instructions were given. The patient verbalized understanding. Patient armband removed and shredded.

## 2019-03-12 ENCOUNTER — HOSPITAL ENCOUNTER (OUTPATIENT)
Dept: PHYSICAL THERAPY | Age: 24
Discharge: HOME | End: 2019-03-12
Payer: MEDICAID

## 2019-03-12 PROCEDURE — 97110 THERAPEUTIC EXERCISES: CPT

## 2019-03-12 PROCEDURE — 97035 APP MDLTY 1+ULTRASOUND EA 15: CPT

## 2019-03-12 PROCEDURE — 97140 MANUAL THERAPY 1/> REGIONS: CPT

## 2019-03-12 NOTE — PROGRESS NOTES
PT DAILY TREATMENT NOTE 10-18    Patient Name: Tracey Nicholas  Date:3/12/2019  : 1995  [x]  Patient  Verified  Payor: BLUE CROSS MEDICAID / Plan: [facility] / Product Type: Managed Care Medicaid /    In time:11;20  Out time:12;10  Total Treatment Time (min): 50  Visit #: 4 of 10    Medicare/BCBS Only   Total Timed Codes (min):  50 1:1 Treatment Time:  40       Treatment Area: Pain in right knee [M25.561]  Pain in left arm [M79.602]    SUBJECTIVE  Pain Level (0-10 scale): 5 arm, 0 knee  Any medication changes, allergies to medications, adverse drug reactions, diagnosis change, or new procedure performed?: [x] No    [] Yes (see summary sheet for update)  Subjective functional status/changes:   [] No changes reported  My arm is really hurting from working last night.     OBJECTIVE    Modality rationale: decrease inflammation, decrease pain and increase tissue extensibility to improve the patients ability to improve ease of lifting carrying, walking   Min Type Additional Details    [] Estim:  []Unatt       []IFC  []Premod                        []Other:  []w/ice   []w/heat  Position:  Location:    [] Estim: []Att    []TENS instruct  []NMES                    []Other:  []w/US   []w/ice   []w/heat  Position:  Location:    []  Traction: [] Cervical       []Lumbar                       [] Prone          []Supine                       []Intermittent   []Continuous Lbs:  [] before manual  [] after manual   5 x 2 [x]  Ultrasound: [x]Continuous   [x] Pulsed                           []1MHz   [x]3MHz W/cm2:  .8-1.0  Location: right knee fat pad, left distal tricep insertion, wrist extensors    []  Iontophoresis with dexamethasone         Location: [] Take home patch   [] In clinic    []  Ice     []  heat  []  Ice massage  []  Laser   []  Anodyne Position:  Location:    []  Laser with stim  []  Other:  Position:  Location:    []  Vasopneumatic Device Pressure:       [] lo [] med [] hi Temperature: [] lo [] med [] hi   [x] Skin assessment post-treatment:  [x]intact []redness- no adverse reaction    []redness - adverse reaction:       30 min Therapeutic Exercise:  [] See flow sheet :   Rationale: increase ROM and increase strength to improve the patients ability to perform daily tasks and job requirements    10 min Manual Therapy:  STM, TPR to left wrist extensors, distal tricep   Rationale: decrease pain, increase tissue extensibility and decrease trigger points to perform job tasks of lifting, carrying       With   [] TE   [] TA   [] neuro   [] other: Patient Education: [x] Review HEP    [] Progressed/Changed HEP based on:   [] positioning   [] body mechanics   [] transfers   [] heat/ice application    [] other:      Other Objective/Functional Measures:      Pain Level (0-10 scale) post treatment:  2 elbow    ASSESSMENT/Changes in Function:  Continued tenderness, pain at distal tricep insertion. Symptoms aggravated by carrying pizza boxes    Patient will continue to benefit from skilled PT services to modify and progress therapeutic interventions, address functional mobility deficits, address ROM deficits, address strength deficits, analyze and address soft tissue restrictions, analyze and cue movement patterns, analyze and modify body mechanics/ergonomics, assess and modify postural abnormalities, address imbalance/dizziness and instruct in home and community integration to attain remaining goals. []  See Plan of Care  []  See progress note/recertification  []  See Discharge Summary         Progress towards goals / Updated goals:  Goal: Pt to be compliant with initial HEP to improve left forearm symptoms and right LE strength.   Status at last note/certification: Established and reviewed with Pt  Current: reports no problems with the HEP.   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 Status at last note/certification: limited a lot in performing per Pt report  Goal: Pt to increase right hip strength to 5/5 grossly to negotiate stairs without increased difficulty or pain for ease with job duties. Status at last note/certification: 4/5 grossly  Goal: Pt to report < 5/10 pain at worst in right knee to work full work shifts without increased difficulty. Status at last note/certification: 8/93 pain at worst  Goal: Pt to report only 1-2 episodes of right knee locking with change in positions weekly to reduce pain with gait. Status at last note/certification: locking multiple times daily  Goal: Pt to report FOTO score of 71 pts to show improved function and quality of life.   Status at last note/certification: FOTO 56 pts    PLAN  [x]  Upgrade activities as tolerated     []  Continue plan of care  []  Update interventions per flow sheet       []  Discharge due to:_  []  Other:_      Jimena Keita, PTA 3/12/2019  11:52 AM    Future Appointments   Date Time Provider Lucita Chen   3/15/2019 11:15 AM Brittany Macias [facility] REE [facility]   3/19/2019 10:30 AM [place], [address] [place] [address]   3/22/2019 11:15 AM Chen Goyal, PT [facility]   3/26/2019 11:15 AM Brittany Macias [facility]   3/29/2019 11:15 AM Laron [address] [place] [address]

## 2019-03-15 ENCOUNTER — HOSPITAL ENCOUNTER (OUTPATIENT)
Dept: PHYSICAL THERAPY | Age: 24
Discharge: HOME | End: 2019-03-15
Payer: MEDICAID

## 2019-03-15 PROCEDURE — 97035 APP MDLTY 1+ULTRASOUND EA 15: CPT

## 2019-03-15 PROCEDURE — 97112 NEUROMUSCULAR REEDUCATION: CPT

## 2019-03-15 NOTE — PROGRESS NOTES
PT DAILY TREATMENT NOTE 10-18    Patient Name: Fabian Bauer  Date:3/15/2019  : 1995  [x]  Patient  Verified  Payor: BLUE CROSS MEDICAID / Plan: [address] [place] [address] / Product Type: Managed Care Medicaid /    In time:11;15  Out time:12;00  Total Treatment Time (min): 45  Visit #: 5 of 10    Medicare/BCBS Only   Total Timed Codes (min):  45 1:1 Treatment Time:  45       Treatment Area: Pain in right knee [M25.561]  Pain in left arm [M79.602]    SUBJECTIVE  Pain Level (0-10 scale): 0  Any medication changes, allergies to medications, adverse drug reactions, diagnosis change, or new procedure performed?: [x] No    [] Yes (see summary sheet for update)  Subjective functional status/changes:   [] No changes reported  I worked last night and was busy, but no pain    OBJECTIVE    Modality rationale: decrease inflammation, decrease pain and increase tissue extensibility to improve the patients ability to reduce episodes of popping in knee, ease of lifting, carrying   Min Type Additional Details    [] Estim:  []Unatt       []IFC  []Premod                        []Other:  []w/ice   []w/heat  Position:  Location:    [] Estim: []Att    []TENS instruct  []NMES                    []Other:  []w/US   []w/ice   []w/heat  Position:  Location:    []  Traction: [] Cervical       []Lumbar                       [] Prone          []Supine                       []Intermittent   []Continuous Lbs:  [] before manual  [] after manual   5 x 2 [x]  Ultrasound: []Continuous   [x] Pulsed                           []1MHz   [x]3MHz W/cm2:  1.0  Location: left distal triceps, right patellar fat pad    []  Iontophoresis with dexamethasone         Location: [] Take home patch   [] In clinic    []  Ice     []  heat  []  Ice massage  []  Laser   []  Anodyne Position:  Location:    []  Laser with stim  []  Other:  Position:  Location:    []  Vasopneumatic Device Pressure:       [] lo [] med [] hi   Temperature: [] lo [] med [] hi [x] Skin assessment post-treatment:  [x]intact []redness- no adverse reaction    []redness - adverse reaction:          35 min Neuromuscular Re-education:  []  See flow sheet :   Rationale: increase strength and improve coordination  to improve the patients ability to improve stability in knee for prolonged standing. UE strength for lifting/carrying          With   [] TE   [] TA   [] neuro   [] other: Patient Education: [x] Review HEP    [] Progressed/Changed HEP based on:   [] positioning   [] body mechanics   [] transfers   [] heat/ice application    [] other:      Other Objective/Functional Measures: added 2# weight to prone UE ex's  Added reformer feet in straps     Pain Level (0-10 scale) post treatment:  0    ASSESSMENT/Changes in Function: Has not reported any pain in last few days. Was able to work full shift last night, busy evening, no pain  Pt has c/o hip soreness, fatigue with ex's, clams x 3    Patient will continue to benefit from skilled PT services to modify and progress therapeutic interventions, address functional mobility deficits, address ROM deficits, address strength deficits, analyze and address soft tissue restrictions, analyze and cue movement patterns, analyze and modify body mechanics/ergonomics, assess and modify postural abnormalities, address imbalance/dizziness and instruct in home and community integration to attain remaining goals. []  See Plan of Care  []  See progress note/recertification  []  See Discharge Summary         Progress towards goals / Updated goals:  Goal: Pt to be compliant with initial HEP to improve left forearm symptoms and right LE strength. Status at last note/certification: Established and reviewed with Pt  Current: reports no problems with the HEP.   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   Status at last note/certification: limited a lot in performing per Pt report  Pushes through as needed. No pain last nights shift, despite being busy. Goal: Pt to increase right hip strength to 5/5 grossly to negotiate stairs without increased difficulty or pain for ease with job duties. Status at last note/certification: 4/5 grossly  Goal: Pt to report < 5/10 pain at worst in right knee to work full work shifts without increased difficulty. Status at last note/certification: 9/78 pain at worst   7/10, infrequent. Has not any episodes of waking her at night. Goal: Pt to report only 1-2 episodes of right knee locking with change in positions weekly to reduce pain with gait. Status at last note/certification: locking multiple times daily  No episodes of locking since [facility]. Goal: Pt to report FOTO score of 71 pts to show improved function and quality of life.   Status at last note/certification: FOTO 56 pts     PLAN  [x]  Upgrade activities as tolerated     []  Continue plan of care  []  Update interventions per flow sheet       []  Discharge due to:_  []  Other:_      Tricia Slaughter, KOLBY 3/15/2019  11:40 AM    Future Appointments   Date Time Provider Lucita Chen   3/19/2019 10:30 AM Isaac Olmos   3/22/2019 11:15 AM Franklin Goyal, PT [facility]   3/26/2019 11:15 AM Lar[place], [address]   3/29/2019 11:15 AM Laron, [address]

## 2019-03-19 ENCOUNTER — HOSPITAL ENCOUNTER (OUTPATIENT)
Dept: PHYSICAL THERAPY | Age: 24
Discharge: HOME | End: 2019-03-19
Payer: MEDICAID

## 2019-03-19 PROCEDURE — 97140 MANUAL THERAPY 1/> REGIONS: CPT

## 2019-03-19 PROCEDURE — 97035 APP MDLTY 1+ULTRASOUND EA 15: CPT

## 2019-03-19 PROCEDURE — 97112 NEUROMUSCULAR REEDUCATION: CPT

## 2019-03-19 NOTE — PROGRESS NOTES
PT DAILY TREATMENT NOTE 10-18    Patient Name: Simeon Nicholas  Date:3/19/2019  : 1995  [x]  Patient  Verified  Payor: BLUE CROSS MEDICAID / Plan: [address] [place] [address] / Product Type: Managed Care Medicaid /    In time:10;35  Out time:11;30  Total Treatment Time (min): 55  Visit #: 6 of 10    Medicare/BCBS Only   Total Timed Codes (min):  55 1:1 Treatment Time:  40       Treatment Area: Pain in right knee [M25.561]  Pain in left arm [M79.602]    SUBJECTIVE  Pain Level (0-10 scale): 3 elbow, 0 knee  Any medication changes, allergies to medications, adverse drug reactions, diagnosis change, or new procedure performed?: [x] No    [] Yes (see summary sheet for update)  Subjective functional status/changes:   [] No changes reported  The pain doesn't lat as long now    OBJECTIVE    Modality rationale: decrease inflammation, decrease pain and increase tissue extensibility to improve the patients ability to perform lifting carrying for job tasks, walking   Min Type Additional Details    [] Estim:  []Unatt       []IFC  []Premod                        []Other:  []w/ice   []w/heat  Position:  Location:    [] Estim: []Att    []TENS instruct  []NMES                    []Other:  []w/US   []w/ice   []w/heat  Position:  Location:    []  Traction: [] Cervical       []Lumbar                       [] Prone          []Supine                       []Intermittent   []Continuous Lbs:  [] before manual  [] after manual   5 x 2 [x]  Ultrasound: [x]Continuous   [x] Pulsed                           []1MHz   [x]3MHz W/cm2: 1.0  Location: rightknee fat pad, l;eft tricep (distally)    []  Iontophoresis with dexamethasone         Location: [] Take home patch   [] In clinic    []  Ice     []  heat  []  Ice massage  []  Laser   []  Anodyne Position:  Location:    []  Laser with stim  []  Other:  Position:  Location:    []  Vasopneumatic Device Pressure:       [] lo [] med [] hi   Temperature: [] lo [] med [] hi   [x] Skin assessment post-treatment:  [x]intact []redness- no adverse reaction    []redness - adverse reaction:          37 min Neuromuscular Re-education:  []  See flow sheet :   Rationale: increase strength and improve balance  to improve the patients ability to to more easily perform walking/ stairs for job tasks, UE function    8 min Manual Therapy:  STM to wrist extensors, distal tricep. 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 States the the pain duration has decreased    Patient will continue to benefit from skilled PT services to modify and progress therapeutic interventions, address functional mobility deficits, address ROM deficits, address strength deficits, analyze and address soft tissue restrictions, analyze and cue movement patterns, analyze and modify body mechanics/ergonomics, assess and modify postural abnormalities, address imbalance/dizziness and instruct in home and community integration to attain remaining goals. []  See Plan of Care  []  See progress note/recertification  []  See Discharge Summary         Progress towards goals / Updated goals:   Goal: Pt to be compliant with initial HEP to improve left forearm symptoms and right LE strength.   Status at last note/certification: Established and reviewed with Pt  Current: reports no problems with the HEP.   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 Status at last note/certification: limited a lot in performing per Pt report  Pushes through as needed. 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 So pain did increase. Goal: Pt to increase right hip strength to 5/5 grossly to negotiate stairs without increased difficulty or pain for ease with job duties. Status at last note/certification: 4/5 grossly  Goal: Pt to report < 5/10 pain at worst in right knee to work full work shifts without increased difficulty. Status at last note/certification: 9/27 pain at worst   7/10, infrequent. Has not any episodes of waking her at night. Goal: Pt to report only 1-2 episodes of right knee locking with change in positions weekly to reduce pain with gait. Status at last note/certification: locking multiple times daily  No episodes of locking since [facility]. Goal: Pt to report FOTO score of 71 pts to show improved function and quality of life.   Status at last note/certification: FOTO 56 pts       PLAN  [x]  Upgrade activities as tolerated     []  Continue plan of care  []  Update interventions per flow sheet       []  Discharge due to:_  []  Other:_      Blanca Hubbard, PTA 3/19/2019  11:02 AM    Future Appointments   Date Time Provider Lucita Chen   3/22/2019 11:15 AM Sandee Goyal, PT [facility]   3/26/2019 11:15 AM Laron, [address] [place] [address]   3/29/2019 11:15 AM Laron [address] [place] [address]

## 2019-03-26 ENCOUNTER — HOSPITAL ENCOUNTER (OUTPATIENT)
Dept: PHYSICAL THERAPY | Age: 24
Discharge: HOME | End: 2019-03-26
Payer: MEDICAID

## 2019-03-26 PROCEDURE — 97140 MANUAL THERAPY 1/> REGIONS: CPT

## 2019-03-26 PROCEDURE — 97035 APP MDLTY 1+ULTRASOUND EA 15: CPT

## 2019-03-26 PROCEDURE — 97530 THERAPEUTIC ACTIVITIES: CPT

## 2019-03-26 NOTE — PROGRESS NOTES
[facility] - [address]  [phone] fax    Progress Note  Patient name: Cleo Echavarria Start of Care: 2019   Referral source: Lucio Hutchinson NP : 1995                Medical Diagnosis: Pain in right knee [M25.561]  Pain in left arm [M79.602]  Payor: BLUE CROSS MEDICAID / Plan: [facility] [employer] / Product Type: Managed Care Medicaid /  Onset Date:19                Treatment Diagnosis: Left Elbow/Wrist pain; Right Knee pain   Prior Hospitalization: see medical history Provider#: [id number]   Medications: Verified on Patient summary List    Comorbidities: Depression; Headaches; Latex allergy; Asthma   Prior Level of Function: 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[employer]; lives in [place] with parents; walks dog 1 mile daily  Visits from [place] of Care: 7    Missed Visits: 2    Established Goals:          Excellent Good         Limited           None  [] Increased ROM   []  []  []  []  [x] Increased Strength  []  [x]  []  []  [x] Increased Mobility  []  []  [x]  []   [x] Decreased Pain   []  []  [x]  []  [] Decreased Swelling  []  []  []  []    Key Functional Changes:   Functional Gains: pain does not last as long and is less severe in the elbow and knee, does not awake her up as much at night, less pain at work  Functional Deficits: pain is intermittent and sporadic in nature and continues to have pain with functional and work tasks, more pain when pt does not have the therapy appointments. Pain         Best: 0/10 for elbow and knee                Worst: 6/10 for elbow and knee    Current goals:  Short Term Goals: To be accomplished in 1 weeks  Goal: Pt to be compliant with initial HEP to improve left forearm symptoms and right LE strength.   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   Goal: Pt to increase right hip strength to 5/5 grossly to negotiate stairs without increased difficulty or pain for ease with job duties. Status at last note/certification: 4/5 grossly  Current: not met, flex 4+/5, ADD/ABD 4/5 with pain in the medial right knee, EXT 4/5  Goal: Pt to report < 5/10 pain at worst in right knee to work full work shifts without increased difficulty. Status at last note/certification: 8/66 pain at [employer]  Current: progressing, 6/10 at worst for elbow and knee  Goal: Pt to report only 1-2 episodes of right knee locking with change in positions weekly to reduce pain with gait. Status at last note/certification: locking multiple times daily  Current: MET, 1x over the past 2 weeks. Goal: Pt to report FOTO score of 71 pts to show improved function and quality of life. Status at last note/certification: FOTO 56 pts  Current: not met, decreased to 54 points but reports improvement in overall pain and symptoms. 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 Status at last note/certification: reports having increased pain with lifting/carrying multiple pizza boxes/sodas.   Goal: Pt to increase right hip strength to 5/5 grossly to negotiate stairs without increased difficulty or pain for ease with job duties. Status at last note/certification: flex 4+/5, ADD/ABD 4/5 with pain in the medial right knee, EXT 4/5  Goal: Pt to report < 3/10 pain at worst in right knee to work full work shifts without increased difficulty.   Status at last note/certification: 4/15 at worst for elbow and knee  Goal: Pt to report FOTO score of 71 pts to show improved function and quality of life. Status at last note/certification: decreased to 54 points but reports improvement in overall pain and symptoms. ASSESSMENT/RECOMMENDATIONS:  Pt reports/demonstrates improvements in pain, mobility, and improved sleep quality since starting therapy. Pt's pain seems to be sporadic in nature with fluctuating pain levels pre-therapy sessions but states that her pain is less intense and does not last as long. We will plan on continuing therapy at this time to improve the pt's pain and tolerance to work and functional tasks. [x]Continue therapy per initial plan/protocol at a frequency of  2 x per week for 5 weeks  []Continue therapy with the following recommended changes:_____________________      _____________________________________________________________________  []Discontinue therapy progressing towards or have reached established goals  []Discontinue therapy due to lack of appreciable progress towards goals  []Discontinue therapy due to lack of attendance or compliance  []Await Physician's recommendations/decisions regarding therapy  []Other:________________________________________________________________    Thank you for this referral.   Edi Worthington, PT 3/26/2019 12:22 PM  NOTE TO PHYSICIAN:  PLEASE COMPLETE THE ORDERS BELOW AND   FAX TO [facility]: [phone]  If you are unable to process this request in 24 hours please contact our office: [phone]  []  I have read the above report and request that my patient continue as recommended. []  I have read the above report and request that my patient continue therapy with the following changes/special instructions:________________________________________  []I have read the above report and request that my patient be discharged from therapy.     [facility] Signature:____________Date:_________TIME:________    [facility], Date and Time must be completed for valid certification **

## 2019-03-26 NOTE — PROGRESS NOTES
PT DAILY TREATMENT NOTE 10-18    Patient Name: Jesus Nicholas  Date:3/26/2019  : 1995  [x]  Patient  Verified  Payor: BLUE CROSS MEDICAID / Plan: [facility] Angela Woo / Product Type: Managed Care Medicaid /    In time: 10:38   Out time: 11:29  Total Treatment Time (min): 51  Visit #: 7 of 10    Medicare/BCBS Only   Total Timed Codes (min):  55 1:1 Treatment Time:  40       Treatment Area: Pain in right knee [M25.561]  Pain in left arm [M79.602]    SUBJECTIVE  Pain Level (0-10 scale): 0 elbow, 2 knee  Any medication changes, allergies to medications, adverse drug reactions, diagnosis change, or new procedure performed?: [x] No    [] Yes (see summary sheet for update)  Subjective functional status/changes:   [] No changes reported  Pt reports that her pain is less intense and does not last as long since starting therapy, but continues to have flare ups of pain.      OBJECTIVE    Modality rationale: decrease inflammation, decrease pain and increase tissue extensibility to improve the patients ability to perform lifting carrying for job tasks   Min Type Additional Details    [] Estim:  []Unatt       []IFC  []Premod                        []Other:  []w/ice   []w/heat  Position:  Location:    [] Estim: []Att    []TENS instruct  []NMES                    []Other:  []w/US   []w/ice   []w/heat  Position:  Location:    []  Traction: [] Cervical       []Lumbar                       [] Prone          []Supine                       []Intermittent   []Continuous Lbs:  [] before manual  [] after manual   10 (5 mins on right knee, 5 mins on left elbow) [x]  Ultrasound: []Continuous   [x] Pulsed                           []1MHz   [x]3MHz W/cm2: 1.0  Location: right knee/fat pad, left distal    []  Iontophoresis with dexamethasone         Location: [] Take home patch   [] In clinic    []  Ice     []  heat  []  Ice massage  []  Laser   []  Anodyne Position:  Location:    []  Laser with stim  []  Other: Position:  Location:    []  Vasopneumatic Device Pressure:       [] lo [] med [] hi   Temperature: [] lo [] med [] hi   [x] Skin assessment post-treatment:  [x]intact []redness- no adverse reaction    []redness - adverse reaction:     35 min Therapeutic Activity:  []  See flow sheet : FOTO, goal reassessment, pt education on how to perform left ulnar and radial nerve glides for HEP    Rationale: increase ROM and increase strength  to improve the patients ability to tolerate work tasks with less pain    10 min Manual Therapy: STM left wrist extensors and distal tricep. Right elbow manual distraction   Rationale: decrease pain, increase ROM and increase tissue extensibility to improve ease of lifting, carrying          With   [] TE   [] TA   [] neuro   [] other: Patient Education: [x] Review HEP    [] Progressed/Changed HEP based on:   [] positioning   [] body mechanics   [] transfers   [] heat/ice application    [] other:      Other Objective/Functional Measures: See goals below. Held exercises secondary to TC. Functional Gains: pain does not last as long and is less severe in the elbow and knee, does not awake her up as much at night, less pain at work  Functional Deficits: pain is intermittent and sporadic in nature and continues to have pain with functional and work tasks, more pain when pt does not have the therapy appointments. Pain       Best: 0/10 for elbow and knee     Worst: 6/10 for elbow and knee    Pain Level (0-10 scale) post treatment: 0 elbow, 3 knee    ASSESSMENT/Changes in Function: See PN. Pt reports/demonstrates improvements in pain, mobility, and improved sleep quality since starting therapy. Pt's pain seems to be sporadic in nature with fluctuating pain levels pre-therapy sessions but states that her pain is less intense and does not last as long. We will plan on continuing therapy at this time to improve the pt's pain and tolerance to work and functional tasks.       Patient will continue to benefit from skilled PT services to modify and progress therapeutic interventions, address functional mobility deficits, address ROM deficits, address strength deficits, analyze and address soft tissue restrictions, analyze and cue movement patterns, analyze and modify body mechanics/ergonomics, assess and modify postural abnormalities, address imbalance/dizziness and instruct in home and community integration to attain remaining goals. []  See Plan of Care  [x]  See progress note/recertification  []  See Discharge Summary         Progress towards goals / Updated goals:   Goal: Pt to be compliant with initial HEP to improve left forearm symptoms and right LE strength. 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 Goal: Pt to increase right hip strength to 5/5 grossly to negotiate stairs without increased difficulty or pain for ease with job duties. Status at last note/certification: 4/5 grossly  Current: not met, flex 4+/5, ADD/ABD 4/5 with pain in the medial right knee, EXT 4/5  Goal: Pt to report < 5/10 pain at worst in right knee to work full work shifts without increased difficulty. Status at last note/certification: 0/49 pain at worst   Current: progressing, 6/10 at worst for elbow and knee  Goal: Pt to report only 1-2 episodes of right knee locking with change in positions weekly to reduce pain with gait. Status at last note/certification: locking multiple times daily  Current: MET, 1x over the past 2 weeks.   Goal: Pt to report FOTO score of 71 pts to show improved function and quality of life.   Status at last note/certification: FOTO 56 pts  Current: not met, decreased to 54 points but reports improvement in overall pain and symptoms.      PLAN  [x]  Upgrade activities as tolerated     [x]  Continue plan of care  [x]  Update interventions per flow sheet       []  Discharge due to:_  []  Other:_      Oliver Jacinto, PT 3/26/2019  10:43 AM    Future Appointments   Date Time Provider Lucita Chen   3/29/2019 11:15 AM Laron, [address]

## 2019-03-29 ENCOUNTER — HOSPITAL ENCOUNTER (OUTPATIENT)
Dept: PHYSICAL THERAPY | Age: 24
Discharge: HOME | End: 2019-03-29
Payer: MEDICAID

## 2019-03-29 PROCEDURE — 97112 NEUROMUSCULAR REEDUCATION: CPT

## 2019-03-29 NOTE — PROGRESS NOTES
PT DAILY TREATMENT NOTE 10-18    Patient Name: Demar Nicholas  Date:3/29/2019  : 1995  [x]  Patient  Verified  Payor: BLUE CROSS MEDICAID / Plan: [facility] [employer] / Product Type: Managed Care Medicaid /    In time:11;15  Out time:11;55  Total Treatment Time (min): 40  Visit #: 1 of 10    Medicare/BCBS Only   Total Timed Codes (min):   1:1 Treatment Time:         Treatment Area: Pain in right knee [M25.561]  Pain in left arm [M79.602]    SUBJECTIVE  Pain Level (0-10 scale): 0  Any medication changes, allergies to medications, adverse drug reactions, diagnosis change, or new procedure performed?: [x] No    [] Yes (see summary sheet for update)  Subjective functional status/changes:   [] No changes reported  I feel ok, just a little sore in my arm    OBJECTIVE     40 min Neuromuscular Re-education:  []  See flow sheet :   Rationale: increase strength, improve coordination and improve balance  to improve the patients ability to increase knee and scapular stability for ease of lifting, carrying for job tasks          With   [] TE   [] TA   [] neuro   [] other: Patient Education: [x] Review HEP    [] Progressed/Changed HEP based on:   [] positioning   [] body mechanics   [] transfers   [] heat/ice application    [] other:      Other Objective/Functional Measures:   Held US and MT to assess symptom mangement    Pain Level (0-10 scale) post treatment:  0    ASSESSMENT/Changes in Function: pain in left arm very mild today, and no pain in knee. Symptoms in knee ar typically that of a mild catching. But intermittent.   Progressed program to address increased stability,, WB in LE's      Patient will continue to benefit from skilled PT services to modify and progress therapeutic interventions, address functional mobility deficits, address ROM deficits, address strength deficits, analyze and address soft tissue restrictions, analyze and cue movement patterns, analyze and modify body mechanics/ergonomics, assess and modify postural abnormalities, address imbalance/dizziness and instruct in home and community integration to attain remaining goals. 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 Status at last note/certification: reports having increased pain with lifting/carrying multiple pizza boxes/sodas.   Goal: Pt to increase right hip strength to 5/5 grossly to negotiate stairs without increased difficulty or pain for ease with job duties. Status at last note/certification: flex 4+/5, ADD/ABD 4/5 with pain in the medial right knee, EXT 4/5  Goal: Pt to report < 3/10 pain at worst in right knee to work full work shifts without increased difficulty. Status at last note/certification: 2/21 at worst for elbow and knee  Goal: Pt to report FOTO score of 71 pts to show improved function and quality of life. Status at last note/certification: decreased to 54 points but reports improvement in overall pain and symptoms.     PLAN  [x]  Upgrade activities as tolerated     []  Continue plan of care  []  Update interventions per flow sheet       []  Discharge due to:_  []  Other:_      Subhash Delacruz PTA 3/29/2019  11:19 AM    No future appointments.

## 2019-04-02 ENCOUNTER — HOSPITAL ENCOUNTER (OUTPATIENT)
Dept: PHYSICAL THERAPY | Age: 24
Discharge: HOME | End: 2019-04-02
Payer: MEDICAID

## 2019-04-02 PROCEDURE — 97110 THERAPEUTIC EXERCISES: CPT

## 2019-04-02 NOTE — PROGRESS NOTES
PT DAILY TREATMENT NOTE 10-18    Patient Name: Joyce Manjarrez  Date:2019  : 1995  [x]  Patient  Verified  Payor: BLUE CROSS MEDICAID / Plan: [facility] Lucila Luemily / Product Type: Managed Care Medicaid /    In time:5:45  Out time:6:30  Total Treatment Time (min): 45  Visit #: 2 of 10    Medicare/BCBS Only   Total Timed Codes (min):  45 1:1 Treatment Time:  40       Treatment Area: Pain in right knee [M25.561]  Pain in left arm [M79.602]    SUBJECTIVE  Pain Level (0-10 scale): 0/10 arm 2/10 knee  Any medication changes, allergies to medications, adverse drug reactions, diagnosis change, or new procedure performed?: [x] No    [] Yes (see summary sheet for update)  Subjective functional status/changes:   [] No changes reported  \"I haven't had any pain in my arm this week\". OBJECTIVE    45 min Therapeutic Exercise:  [x] See flow sheet :   Rationale: increase ROM and increase strength to improve the patients ability to perform ADL's without pain. With   [] TE   [] TA   [] neuro   [] other: Patient Education: [x] Review HEP    [] Progressed/Changed HEP based on:   [] positioning   [] body mechanics   [] transfers   [] heat/ice application    [] other:      Other Objective/Functional Measures: Performed TE's per flow sheet without increased pain. Pain Level (0-10 scale) post treatment: 0/10 arm 2/10 knee    ASSESSMENT/Changes in Function: Pt performed TE's well taking verbal and tactile cues. Pt had slight discomfort in her right knee during reformer supine series. Pt was cued to decrease ROM. Patient will continue to benefit from skilled PT services to modify and progress therapeutic interventions, address functional mobility deficits, address ROM deficits, address strength deficits, analyze and address soft tissue restrictions and analyze and cue movement patterns to attain remaining goals.      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 Status at last note/certification: reports having increased pain with lifting/carrying multiple pizza boxes/sodas.   Goal: Pt to increase right hip strength to 5/5 grossly to negotiate stairs without increased difficulty or pain for ease with job duties. Status at last note/certification: flex 4+/5, ADD/ABD 4/5 with pain in the medial right knee, EXT 4/5  Goal: Pt to report < 3/10 pain at worst in right knee to work full work shifts without increased difficulty. Status at last note/certification: 6/45 at worst for elbow and knee  Goal: Pt to report FOTO score of 71 pts to show improved function and quality of life.   Status at last note/certification: decreased to 54 points but reports improvement in overall pain and symptoms.        PLAN  []  Upgrade activities as tolerated     []  Continue plan of care  []  Update interventions per flow sheet       []  Discharge due to:_  []  Other:_      Pinky Shultz PTA 4/2/2019  6:36 PM    Future Appointments   Date Time Provider Lucita Chen   4/4/2019 12:00 PM Alesha House [facility]   4/9/2019 10:30 AM [facility]   4/11/2019 11:15 AM Yudi Veloz [facility]   4/16/2019 11:15 AM Alesha House [facility]   4/19/2019 11:15 AM [facility]   4/23/2019 11:15 AM [facility]   4/25/2019 11:15 AM Carrie Larry [address]   4/30/2019 11:15 AM Lenwood Popp, Cordella Riedel, PT [facility]

## 2019-04-11 ENCOUNTER — APPOINTMENT (OUTPATIENT)
Dept: PHYSICAL THERAPY | Age: 24
End: 2019-04-11
Payer: MEDICAID

## 2019-04-16 ENCOUNTER — APPOINTMENT (OUTPATIENT)
Dept: PHYSICAL THERAPY | Age: 24
End: 2019-04-16
Payer: MEDICAID

## 2019-04-18 NOTE — PROGRESS NOTES
[facility], [address] [place] [address]  [phone] fax    Discharge Summary    Patient name: Dolly Nicholas Start of Care: 2/27/2019   Referral Artis Brown NP KCX: 3/33/8237                Medical Diagnosis: Pain in right knee [M25.561]  Pain in left arm [M79.602]  Payor: BLUE CROSS MEDICAID / Plan: [facility] / Product Type: Managed Care Medicaid /  Onset Date:2/8/19                Treatment Diagnosis: Left Elbow/Wrist pain; Right Knee pain   Prior Hospitalization: see medical history Provider#: [id number]   Medications: Verified on Patient summary List    Comorbidities: Depression; Headaches; Latex allergy; Asthma   Prior Level of Function: Works in  for [employer]; lives in [place] with parents; walks dog 1 mile daily  Visits from [place] of Care: 10    Missed Visits: 4  Reporting Period : 2/27/2019 to 4/9/2019    Goal: 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 Status at last note/certification: unable to reassess   Status at discharge: not met    Goal: Pt to increase right hip strength to 5/5 grossly to negotiate stairs without increased difficulty or pain for ease with job duties. Status at last note/certification: unable to reassess   Status at discharge: not met    Goal: Pt to report < 3/10 pain at worst in right knee to work full work shifts without increased difficulty. Status at last note/certification: unable to reassess   Status at discharge: not met    Goal: Pt to report FOTO score of 71 pts to show improved function and quality of life. Status at last note/certification: unable to reassess   Status at discharge: not met    Assessment/ Summary of Care:   Pt was seen for 10 therapy sessions. Per telephone log, the pt NS 4 therapy appointments (last NS on 4/9/2019).  Pt is d/c'ed from therapy secondary to non-compliance and unable to reassess goals at this time. RECOMMENDATIONS:  [x]Discontinue therapy: []Patient has reached or is progressing toward set goals      [x]Patient is non-compliant or has abdicated      []Due to lack of appreciable progress towards set goals    Steve Hunter, PT 4/18/2019 2:16 PM    NOTE TO PHYSICIAN:  Please complete the following and fax to: [facility] at [phone]  . Retain this original for your records. If you are unable to process this request in   24 hours, please contact our office.      [] I have read the above report and request that my patient continue therapy with the following changes/special instructions:  [] I have read the above report and request that my patient be discharged from therapy    Physician's Signature:____________Date:_________TIME:________    ** Signature, Date and Time must be completed for valid certification **

## 2019-04-19 ENCOUNTER — APPOINTMENT (OUTPATIENT)
Dept: PHYSICAL THERAPY | Age: 24
End: 2019-04-19
Payer: MEDICAID

## 2019-04-23 ENCOUNTER — APPOINTMENT (OUTPATIENT)
Dept: PHYSICAL THERAPY | Age: 24
End: 2019-04-23
Payer: MEDICAID

## 2019-04-25 ENCOUNTER — APPOINTMENT (OUTPATIENT)
Dept: PHYSICAL THERAPY | Age: 24
End: 2019-04-25
Payer: MEDICAID

## 2019-04-30 ENCOUNTER — APPOINTMENT (OUTPATIENT)
Dept: PHYSICAL THERAPY | Age: 24
End: 2019-04-30
Payer: MEDICAID

## 2019-06-19 NOTE — TELEPHONE ENCOUNTER
LAURA- PT called to try to get script for generic Zoloft- says her psych Dr wrote a script but something was wrong with it and pharmacy wouldn't fill it- she has been playing phone tag with that office and now the Dr is out of office- she wanted you to write it- I told her you would not since another Dr is prescribing that med     I also advised her that you are moving out of the area

## 2019-07-23 ENCOUNTER — OFFICE VISIT (OUTPATIENT)
Dept: INTERNAL MEDICINE CLINIC | Age: 24
End: 2019-07-23

## 2019-07-23 VITALS
BODY MASS INDEX: 25.23 KG/M2 | OXYGEN SATURATION: 98 % | TEMPERATURE: 98.2 F | HEART RATE: 80 BPM | SYSTOLIC BLOOD PRESSURE: 102 MMHG | WEIGHT: 157 LBS | HEIGHT: 66 IN | DIASTOLIC BLOOD PRESSURE: 60 MMHG | RESPIRATION RATE: 12 BRPM

## 2019-07-23 DIAGNOSIS — J20.9 ACUTE BRONCHITIS, UNSPECIFIED ORGANISM: Primary | ICD-10-CM

## 2019-07-23 DIAGNOSIS — F17.210 MODERATE CIGARETTE SMOKER: ICD-10-CM

## 2019-07-23 RX ORDER — SERTRALINE HYDROCHLORIDE 50 MG/1
50 TABLET, FILM COATED ORAL DAILY
COMMUNITY
End: 2022-04-28

## 2019-07-23 RX ORDER — ALBUTEROL SULFATE 90 UG/1
2 AEROSOL, METERED RESPIRATORY (INHALATION)
Qty: 1 INHALER | Refills: 1 | Status: SHIPPED | OUTPATIENT
Start: 2019-07-23 | End: 2020-02-26 | Stop reason: SDUPTHER

## 2019-07-23 RX ORDER — AZITHROMYCIN 250 MG/1
TABLET, FILM COATED ORAL
Qty: 6 TAB | Refills: 0 | Status: SHIPPED | OUTPATIENT
Start: 2019-07-23 | End: 2019-07-28

## 2019-07-23 NOTE — PROGRESS NOTES
Francisco Farmer 1995, is a 27 y.o. female, who is seen today for sore throat this been present for about a week, not severely sore but just bothersome. A couple of times she has had a very brief discomfort in both ears but it is gone after momentary sharp pain. She also has been coughing in the mornings when she gets up for the last few months, she has been smoking a pack per day for about 4 years and she knows she should quit, her parents do not smoke and her mother encourages her to quit smoking. She felt a little more short of breath than usual and does have a history of asthma so she used her brothers Asmanex Twisthaler and she thought it helped her. She has used albuterol in the past.    Past Medical History:   Diagnosis Date    Asthma     Knee sprain     Right knee     Current Outpatient Medications   Medication Sig Dispense Refill    sertraline (ZOLOFT) 50 mg tablet Take  by mouth daily. Visit Vitals  /60 (BP 1 Location: Left arm, BP Patient Position: Sitting)   Pulse 80   Temp 98.2 °F (36.8 °C) (Oral)   Resp 12   Ht 5' 6\" (1.676 m)   Wt 157 lb (71.2 kg)   SpO2 98%   BMI 25.34 kg/m²     Pharynx reveals enlargement of the tonsil on the right side but no redness or exudate. Neck reveals no adenopathy or tenderness. Left tympanic membrane and ear canal look normal, fair amount of wax on the right with no redness of the ear canal, I cannot visualize the tympanic membrane on the right side. Lungs are clear to percussion. Good breath sounds with no wheezing or crackles. No coughing while she was here and I asked her to cough it sounded dry. Assessment: #1. Bronchitis, possibly some bacterial overgrowth, and a cigarette smoker. Will treat with azithromycin.   #2.  History of asthma and she now smokes, here no wheezing but we will try her on albuterol as needed which she is used in the past.  I told her the Asmanex really does not help up with just 1 puff, that is for long-term use for asthmatics. #3. She really does want to quit smoking and she is off today so she is planning to not smoke today and is hoping from there on she will be able to not smoke. She has done some reading on the subject. Because of her depression she really does not want to use Chantix and I do not recommend it right now anyway. I talked to her about the most likely side effects with Chantix, i.e. nausea and vivid dreams, but I did tell her if she needs something to help her quit smoking I would prefer she use nicotine patches for a while. Follow-up JAMES Sosa. Lucille Thakkar MD FACP    Please note: This document has been produced using voice recognition software. Unrecognized errors in transcription may be present. This visit lasted 25 minutes, greater than 50% of the time was spent counseling mainly on the importance of quitting smoking and she is well aware of the advantages of quitting, particularly the current cough every morning but also long-term lung damage cancer vascular disease.

## 2019-10-21 ENCOUNTER — OFFICE VISIT (OUTPATIENT)
Dept: INTERNAL MEDICINE CLINIC | Age: 24
End: 2019-10-21

## 2019-10-21 ENCOUNTER — TELEPHONE (OUTPATIENT)
Dept: INTERNAL MEDICINE CLINIC | Age: 24
End: 2019-10-21

## 2019-10-21 VITALS
RESPIRATION RATE: 12 BRPM | OXYGEN SATURATION: 98 % | HEIGHT: 66 IN | TEMPERATURE: 98.5 F | WEIGHT: 160.6 LBS | SYSTOLIC BLOOD PRESSURE: 119 MMHG | BODY MASS INDEX: 25.81 KG/M2 | HEART RATE: 85 BPM | DIASTOLIC BLOOD PRESSURE: 67 MMHG

## 2019-10-21 DIAGNOSIS — R59.1 HEAD AND NECK LYMPHADENOPATHY: ICD-10-CM

## 2019-10-21 DIAGNOSIS — J45.40 MODERATE PERSISTENT ASTHMA WITHOUT COMPLICATION: ICD-10-CM

## 2019-10-21 DIAGNOSIS — J98.8 RESPIRATORY TRACT INFECTION: Primary | ICD-10-CM

## 2019-10-21 PROBLEM — R87.610 ATYPICAL SQUAMOUS CELLS OF UNDETERMINED SIGNIFICANCE (ASCUS) ON PAPANICOLAOU SMEAR OF CERVIX: Status: ACTIVE | Noted: 2019-10-21

## 2019-10-21 RX ORDER — FLUTICASONE PROPIONATE 110 UG/1
2 AEROSOL, METERED RESPIRATORY (INHALATION) EVERY 12 HOURS
Qty: 1 INHALER | Refills: 11 | Status: SHIPPED | OUTPATIENT
Start: 2019-10-21 | End: 2020-02-26 | Stop reason: SDUPTHER

## 2019-10-21 RX ORDER — AZITHROMYCIN 500 MG/1
500 TABLET, FILM COATED ORAL DAILY
Qty: 3 TAB | Refills: 0 | Status: SHIPPED | OUTPATIENT
Start: 2019-10-21 | End: 2019-10-24

## 2019-10-21 RX ORDER — PSEUDOEPHEDRINE HCL 30 MG
30 TABLET ORAL
COMMUNITY
End: 2020-02-26 | Stop reason: ALTCHOICE

## 2019-10-21 RX ORDER — GUAIFENESIN 600 MG/1
600 TABLET, EXTENDED RELEASE ORAL 2 TIMES DAILY
COMMUNITY
End: 2020-02-26 | Stop reason: ALTCHOICE

## 2019-10-21 NOTE — LETTER
NOTIFICATION RETURN TO WORK / SCHOOL 
 
10/21/2019 3:18 PM 
 
Ms. Lissa Lopez [address] [facility] [address] To Whom It May Concern: 
 
Lissa Lopez is currently under the care of Juwan Suero. She will return to work/school on: 10-22-19 If there are questions or concerns please have the patient contact our office.  
 
 
 
Sincerely, 
 
 
Patricio Dee MD

## 2019-10-21 NOTE — TELEPHONE ENCOUNTER
Pt was seen today and needs a work note just for today. Please call her at [phone] when ready to .

## 2019-10-21 NOTE — PATIENT INSTRUCTIONS
Health Maintenance Due   Topic Date Due    HPV Age 9Y-34Y (1 - Female 3-dose series) 02/27/2012    DTaP/Tdap/Td series (1 - Tdap) 02/27/2018          Swollen Lymph Nodes: Care Instructions  Your Care Instructions    Lymph nodes are small, bean-shaped glands throughout the body. They help your body fight germs and infections. Lymph nodes often swell when there is a problem such as an injury, infection, or tumor. · The nodes in your neck, under your chin, or behind your ears may swell when you have a cold or sore throat. · An injury or infection in a leg or foot can make the nodes in your groin swell. · Sometimes medicine can make lymph nodes swell, but this is rare. Treatment depends on what caused your nodes to swell. Usually the nodes return to normal size without a problem. Follow-up care is a key part of your treatment and safety. Be sure to make and go to all appointments, and call your doctor if you are having problems. It's also a good idea to know your test results and keep a list of the medicines you take. How can you care for yourself at home? · Take your medicines exactly as prescribed. Call your doctor if you think you are having a problem with your medicine. · Avoid irritation. ? Do not squeeze or pick at the lump. ? Do not stick a needle in it. · Prevent infection. Do not squeeze, drain, or puncture a painful lump. Doing this can irritate or inflame the lump, push any existing infection deeper into the skin, or cause severe bleeding. · Get extra rest. Slow down just a little from your usual routine. · Drink plenty of fluids, enough so that your urine is light yellow or clear like water. If you have kidney, heart, or liver disease and have to limit fluids, talk with your doctor before you increase the amount of fluids you drink. · Take an over-the-counter pain medicine, such as acetaminophen (Tylenol), ibuprofen (Advil, Motrin), or naproxen (Aleve).  Read and follow all instructions on the label. · Do not take two or more pain medicines at the same time unless the doctor told you to. Many pain medicines have acetaminophen, which is Tylenol. Too much acetaminophen (Tylenol) can be harmful. When should you call for help? Call your doctor now or seek immediate medical care if:    · You have worse symptoms of infection, such as:  ? Increased pain, swelling, warmth, or redness. ? Red streaks leading from the area. ? Pus draining from the area. ? A fever.    Watch closely for changes in your health, and be sure to contact your doctor if:    · Your lymph nodes do not get smaller or do not return to normal.     · You do not get better as expected. Where can you learn more? Go to http://eloise-ant.info/. Enter C418 in the search box to learn more about \"Swollen Lymph Nodes: Care Instructions. \"  Current as of: June 9, 2019  Content Version: 12.2  © 2231-4799 [employer]. Care instructions adapted under license by [employer] (which disclaims liability or warranty for this information). If you have questions about a medical condition or this instruction, always ask your healthcare professional. [place] [address] any warranty or liability for your use of this information.

## 2019-10-21 NOTE — PROGRESS NOTES
Chief Complaint   Patient presents with    Cold Symptoms     x 3 days ROOM 3    Nasal Discharge     light green    Cough     cough productive with light green phlegm       1. Have you been to the ER, urgent care clinic since your last visit? Hospitalized since your last visit? No    2. Have you seen or consulted any other health care providers outside of the [address] [place] [address] since your last visit? Include any pap smears or colon screening. No    Patient was given a copy of the Advanced Directive and understands to bring it in once completed.   Health Maintenance Due   Topic Date Due    HPV Age 9Y-34Y (1 - Female 3-dose series) 02/27/2012    DTaP/Tdap/Td series (1 - Tdap) 02/27/2018

## 2019-10-21 NOTE — PROGRESS NOTES
INTERNISTS OF [facility]:  10/21/2019, MRN: [id number]      Sabino Seth is a 27 y.o. female and presents to clinic for Cold Symptoms (x 3 days ROOM 3); Nasal Discharge (light green); and Cough (cough productive with light green phlegm)    Subjective: The patient is a 32-year-old female with history of moderate persistent asthma, nicotine dependence, dependence, ASCUS, and anxiety/depression. 1. Respiratory Tract Infection: Present: 3 days. Associated sx: rhinorrhea (green), sinus pressure, and a productive cough (green sputum). Alleviating factors: mucinex. Sick contacts: boyfriend and a coworker. Sx are improving. +Fatigue. She stopped smoking and replaced it with vaping. 2.  Neck Lymphadenopathy: Last year, the patient had a neck CT. Findings are listed below. A follow-up study was recommended given diffuse bulky neck lymphadenopathy that was seen-per Radiology recommendations. To my knowledge, no additional studies were obtained. +Sore throat off/on for >6 month. 6/10/18 Neck CT: Bilateral palatine tonsillitis.  No evidence for abscess. Diffuse bulky neck lymphadenopathy, likely reactive.  Followup is recommended. 3. Asthma: She only uses albuterol. She takes it daily. She coughs with asthma flare ups. Sx improved with transitioning to vaping. Patient Active Problem List    Diagnosis Date Noted    Moderate persistent asthma without complication 09/63/7990    Atypical squamous cells of undetermined significance (ASCUS) on Papanicolaou smear of cervix 10/21/2019    Chronic pain of right knee 02/08/2019    Tobacco abuse 02/08/2019    Hyperhidrosis 02/08/2019       Current Outpatient Medications   Medication Sig Dispense Refill    pseudoephedrine (SUDAFED) 30 mg tablet Take 30 mg by mouth daily as needed for Congestion.  guaiFENesin ER (MUCINEX) 600 mg ER tablet Take 600 mg by mouth two (2) times a day.       fluticasone propionate (FLOVENT HFA) 110 mcg/actuation inhaler Take 2 Puffs by inhalation every twelve (12) hours. 1 Inhaler 11    azithromycin (ZITHROMAX) 500 mg tab Take 1 Tab by mouth daily for 3 days. 3 Tab 0    sertraline (ZOLOFT) 50 mg tablet Take 50 mg by mouth daily.  albuterol (PROVENTIL HFA, VENTOLIN HFA, PROAIR HFA) 90 mcg/actuation inhaler Take 2 Puffs by inhalation every four (4) hours as needed for Wheezing. 1 Inhaler 1       No Known Allergies    Past Medical History:   Diagnosis Date    Asthma     Knee sprain     Right knee       History reviewed. No pertinent surgical history. Family History   Problem Relation Age of Onset    Obesity Mother     Obesity Father     No Known Problems Brother     Obesity Maternal Grandmother     Arthritis-rheumatoid Maternal Grandmother        Social History     Tobacco Use    Smoking status: Current Every Day Smoker     Packs/day: 1.00    Smokeless tobacco: Never Used    Tobacco comment: Vaping    Substance Use Topics    Alcohol use: Yes     Alcohol/week: 3.0 standard drinks     Types: 3 Glasses of wine per week       ROS   Review of Systems   Constitutional: Negative for chills and fever. HENT: Positive for sore throat. Negative for ear pain. Eyes: Negative for blurred vision and pain. Respiratory: Positive for cough and sputum production. Negative for shortness of breath. Cardiovascular: Negative for chest pain. Gastrointestinal: Negative for abdominal pain, blood in stool and melena. Genitourinary: Negative for dysuria and hematuria. Musculoskeletal: Negative for joint pain and myalgias. Skin: Negative for rash. Neurological: Negative for tingling, focal weakness and headaches. Endo/Heme/Allergies: Does not bruise/bleed easily. Psychiatric/Behavioral: Negative for substance abuse.        Objective     Vitals:    10/21/19 1405   BP: 119/67   Pulse: 85   Resp: 12   Temp: 98.5 °F (36.9 °C)   TempSrc: Oral   SpO2: 98%   Weight: 160 lb 9.6 oz (72.8 kg)   Height: 5' 6\" (1.676 m)   PainSc:   1   PainLoc: Head   LMP: 10/10/2019       Physical Exam   Constitutional: She is oriented to person, place, and time and well-developed, well-nourished, and in no distress. HENT:   Head: Normocephalic and atraumatic. Right Ear: External ear normal.   Left Ear: External ear normal.   Nose: Nose normal.   Mouth/Throat: No oropharyngeal exudate. Clear TMs. There is a rim of erythema along her posterior oropharynx   Eyes: Pupils are equal, round, and reactive to light. Conjunctivae and EOM are normal. Right eye exhibits no discharge. Left eye exhibits no discharge. No scleral icterus. Neck: Neck supple. Cardiovascular: Normal rate, regular rhythm, normal heart sounds and intact distal pulses. Exam reveals no gallop and no friction rub. No murmur heard. Pulmonary/Chest: Effort normal and breath sounds normal. No respiratory distress. She has no wheezes. She has no rales. Abdominal: Soft. Bowel sounds are normal. She exhibits no distension. There is no tenderness. There is no rebound and no guarding. Musculoskeletal: She exhibits no edema or tenderness (BUE). Lymphadenopathy:     She has cervical adenopathy. Neurological: She is alert and oriented to person, place, and time. She exhibits normal muscle tone. Gait normal.   Skin: Skin is warm and dry. No erythema. Acne vulgaris on face. Psychiatric: Affect normal.   Nursing note and vitals reviewed.       LABS   Data Review:   Lab Results   Component Value Date/Time    WBC 6.3 02/08/2019 11:44 AM    HGB 14.4 02/08/2019 11:44 AM    HCT 44.1 02/08/2019 11:44 AM    PLATELET 115 53/90/0241 11:44 AM    MCV 91 02/08/2019 11:44 AM       Lab Results   Component Value Date/Time    Sodium 144 02/08/2019 11:44 AM    Potassium 4.8 02/08/2019 11:44 AM    Chloride 105 02/08/2019 11:44 AM    CO2 22 02/08/2019 11:44 AM    Glucose 88 02/08/2019 11:44 AM    BUN 10 02/08/2019 11:44 AM    Creatinine 0.62 02/08/2019 11:44 AM    BUN/Creatinine ratio 16 02/08/2019 11:44 AM    GFR est  02/08/2019 11:44 AM    GFR est non- 02/08/2019 11:44 AM    Calcium 9.3 02/08/2019 11:44 AM       No results found for: CHOL, CHOLPOCT, CHOLX, CHLST, CHOLV, HDL, HDLPOC, HDLP, LDL, LDLCPOC, LDLC, DLDLP, VLDLC, VLDL, TGLX, TRIGL, TRIGP, TGLPOCT, CHHD, CHHDX    No results found for: HBA1C, HGBE8, JEM3WOFN, KCB2FFGA, DFB1ZQUJ    Assessment/Plan:   1. Head and neck lymphadenopathy  -Ordering a neck CT per Radiology recommendations. 2. Respiratory Tract Infection: Rapid strep/flu: negative. Will rx for presumed acute bronchitis. +Vaping.   -Rest.  Hydration with water. - We discussed the dangers of vaping. All questions were answered. I encouraged her to stop vaping.  - Ordering a course of azithromycin. Return to clinic to assess her response. 3. Asthma:  - Ordering Flovent. -Return to clinic to assess her response.  -Okay to continue using albuterol as needed. - If she continues to use albuterol frequently with use of Flovent, I will refer her to Pulmonology for PFTs-which was discussed with her today. Health Maintenance Due   Topic Date Due    HPV Age 9Y-34Y (1 - Female 3-dose series) 02/27/2012    DTaP/Tdap/Td series (1 - Tdap) 02/27/2018     Lab review: labs are reviewed in the EHR    I have discussed the diagnosis with the patient and the intended plan as seen in the above orders. The patient has received an after-visit summary and questions were answered concerning future plans. I have discussed medication side effects and warnings with the patient as well. I have reviewed the plan of care with the patient, accepted their input and they are in agreement with the treatment goals. All questions were answered. The patient understands the plan of care. Handouts provided today with above information. Pt instructed if symptoms worsen to call the office or report to the ED for continued care. Greater than 50% of the visit time was spent in counseling and/or coordination of care. Voice recognition was used to generate this report, which may have resulted in some phonetic based errors in grammar and contents. Even though attempts were made to correct all the mistakes, some may have been missed, and remained in the body of the document. Follow-up and Dispositions    · Return in about 6 weeks (around 12/2/2019), or if symptoms worsen or fail to improve.          Sanjay Soni MD

## 2019-12-27 ENCOUNTER — OFFICE VISIT (OUTPATIENT)
Dept: OBGYN CLINIC | Age: 24
End: 2019-12-27

## 2019-12-27 VITALS
SYSTOLIC BLOOD PRESSURE: 111 MMHG | BODY MASS INDEX: 25.58 KG/M2 | DIASTOLIC BLOOD PRESSURE: 76 MMHG | HEIGHT: 66 IN | WEIGHT: 159.2 LBS | RESPIRATION RATE: 18 BRPM | HEART RATE: 75 BPM

## 2019-12-27 DIAGNOSIS — Z01.419 WELL WOMAN EXAM WITH ROUTINE GYNECOLOGICAL EXAM: Primary | ICD-10-CM

## 2019-12-27 LAB
HCG URINE, QL. (POC): NEGATIVE
VALID INTERNAL CONTROL?: YES

## 2019-12-27 RX ORDER — LORATADINE 10 MG/1
10 TABLET ORAL
COMMUNITY
End: 2020-04-27

## 2019-12-27 NOTE — PROGRESS NOTES
Chief Complaint   Patient presents with    Well Woman     room 03     Visit Vitals  /76   Pulse 75   Resp 18   Ht 5' 6\" (1.676 m)   Wt 159 lb 3.2 oz (72.2 kg)   BMI 25.70 kg/m²     Patient want to restart her [address] presents today for   Chief Complaint   Patient presents with    [facility]     room 03    Family Planning       Is someone accompanying this pt? no  atient does not have any concerns at this time. Patient wants  birth control at this time . Depression screening     Is the patient using any DME equipment during OV? no    Depression Screening:  3 most recent PHQ Screens 12/27/2019   PHQ Not Done Active Diagnosis of Depression or Bipolar Disorder   Little interest or pleasure in doing things -   Feeling down, depressed, irritable, or hopeless -   Total Score PHQ 2 -       Learning Assessment:  Learning Assessment 2/8/2019   PRIMARY LEARNER Patient   HIGHEST LEVEL OF EDUCATION - PRIMARY LEARNER  SOME COLLEGE   BARRIERS PRIMARY LEARNER NONE   CO-LEARNER CAREGIVER No   PRIMARY LANGUAGE ENGLISH   LEARNER PREFERENCE PRIMARY DEMONSTRATION   ANSWERED BY behzad   RELATIONSHIP SELF       Abuse Screening:  Abuse Screening Questionnaire 2/8/2019   Do you ever feel afraid of your partner? N   Are you in a relationship with someone who physically or mentally threatens you? N   Is it safe for you to go home? Y       Fall Risk  Fall Risk Assessment, last 12 mths 2/8/2019   Able to walk? Yes   Fall in past 12 months?  No       This Visit Test  Results for orders placed or performed during the hospital encounter of 03/07/19   URINALYSIS W/ RFLX MICROSCOPIC   Result Value Ref Range    Color YELLOW      Appearance CLEAR      Specific gravity 1.016 1.005 - 1.030      pH (UA) 7.0 5.0 - 8.0      Protein NEGATIVE  NEG mg/dL    Glucose NEGATIVE  NEG mg/dL    Ketone NEGATIVE  NEG mg/dL    Bilirubin NEGATIVE  NEG      Blood MODERATE (A) NEG      Urobilinogen 0.2 0.2 - 1.0 EU/dL    Nitrites NEGATIVE  NEG Leukocyte Esterase TRACE (A) NEG     HCG URINE, QL   Result Value Ref Range    HCG urine, QL NEGATIVE  NEG     URINE MICROSCOPIC ONLY   Result Value Ref Range    WBC 0 to 3 0 - 4 /hpf    RBC 4 to 10 0 - 5 /hpf    Epithelial cells 2+ 0 - 5 /lpf       Health Maintenance reviewed and discussed and ordered per Provider. Health Maintenance Due   Topic Date Due    HPV Age 9Y-34Y (3 - Female 2-dose series) 02/27/2008    Influenza Age 5 to Adult  08/01/2019   . Coordination of Care:  1. Have you been to the ER, urgent care clinic since your last visit? Hospitalized since your last visit? no    2. Have you seen or consulted any other health care providers outside of the [address] [place] [address] since your last visit? Include any pap smears or colon screening.  No

## 2019-12-27 NOTE — PROGRESS NOTES
Subjective:   27 y.o. female for Well Woman Check. She has no concerns. No LMP recorded. Social History: single partner, contraception - condoms. Pertinent past medical hstory: no history of HTN, DVT, CAD, DM, liver disease, migraines or smoking. Patient Active Problem List   Diagnosis Code    Chronic pain of right knee M25.561, G89.29    Tobacco abuse Z72.0    Hyperhidrosis R61    Moderate persistent asthma without complication W57.87    Atypical squamous cells of undetermined significance (ASCUS) on Papanicolaou smear of cervix R87.610     Current Outpatient Medications   Medication Sig Dispense Refill    loratadine (CLARITIN) 10 mg tablet Take 10 mg by mouth.  fluticasone propionate (FLOVENT HFA) 110 mcg/actuation inhaler Take 2 Puffs by inhalation every twelve (12) hours. 1 Inhaler 11    sertraline (ZOLOFT) 50 mg tablet Take 50 mg by mouth daily.  pseudoephedrine (SUDAFED) 30 mg tablet Take 30 mg by mouth daily as needed for Congestion.  guaiFENesin ER (MUCINEX) 600 mg ER tablet Take 600 mg by mouth two (2) times a day.  albuterol (PROVENTIL HFA, VENTOLIN HFA, PROAIR HFA) 90 mcg/actuation inhaler Take 2 Puffs by inhalation every four (4) hours as needed for Wheezing. 1 Inhaler 1     No Known Allergies  Past Medical History:   Diagnosis Date    Asthma     Depression     Knee sprain     Right knee     History reviewed. No pertinent surgical history. Family History   Problem Relation Age of Onset    Obesity Mother     Obesity Father     No Known Problems Brother     Obesity Maternal Grandmother     Arthritis-rheumatoid Maternal Grandmother      Social History     Tobacco Use    Smoking status: Current Every Day Smoker     Packs/day: 1.00    Smokeless tobacco: Never Used    Tobacco comment: Vaping    Substance Use Topics    Alcohol use: Yes     Alcohol/week: 3.0 standard drinks     Types: 3 Glasses of wine per week        ROS:  Feeling well.  No dyspnea or chest pain on exertion. No abdominal pain, change in bowel habits, black or bloody stools. No urinary tract symptoms. GYN ROS: normal menses, no abnormal bleeding, pelvic pain or discharge, no breast pain or new or enlarging lumps on self exam. No neurological complaints. Objective:     Visit Vitals  /76   Pulse 75   Resp 18   Ht 5' 6\" (1.676 m)   Wt 159 lb 3.2 oz (72.2 kg)   BMI 25.70 kg/m²     The patient appears well, alert, oriented x 3, in no distress. ENT normal.  Neck supple. No adenopathy or thyromegaly. ROSE. Lungs are clear, good air entry, no wheezes, rhonchi or rales. S1 and S2 normal, no murmurs, regular rate and rhythm. Abdomen soft without tenderness, guarding, mass or organomegaly. Extremities show no edema, normal peripheral pulses. Neurological is normal, no focal findings. BREAST EXAM: breasts appear normal, no suspicious masses, no skin or nipple changes or axillary nodes    PELVIC EXAM: normal external genitalia, vulva, vagina, cervix, uterus and adnexa    Assessment/Plan:   well woman  pap smear  counseled on breast self exam, STD prevention and family planning choices  return annually or prn    ICD-10-CM ICD-9-CM    1. Well woman exam with routine gynecological exam Z01.419 V72.31 PAP IG, CT-NG, RFX HPV VLTO([id number], [id number])      AMB POC URINE PREGNANCY TEST, VISUAL COLOR COMPARISON   .

## 2020-01-02 ENCOUNTER — TELEPHONE (OUTPATIENT)
Dept: INTERNAL MEDICINE CLINIC | Age: 25
End: 2020-01-02

## 2020-01-02 DIAGNOSIS — R59.0 CERVICAL LYMPHADENOPATHY: Primary | ICD-10-CM

## 2020-01-02 LAB
C TRACH RRNA CVX QL NAA+PROBE: NEGATIVE
CYTOLOGIST CVX/VAG CYTO: NORMAL
CYTOLOGY CVX/VAG DOC CYTO: NORMAL
CYTOLOGY CVX/VAG DOC THIN PREP: NORMAL
DX ICD CODE: NORMAL
LABCORP, 190119: NORMAL
Lab: NORMAL
N GONORRHOEA RRNA CVX QL NAA+PROBE: NEGATIVE
OTHER STN SPEC: NORMAL
PATHOLOGIST CVX/VAG CYTO: NORMAL
SPECIMEN STATUS REPORT, ROLRST: NORMAL
STAT OF ADQ CVX/VAG CYTO-IMP: NORMAL

## 2020-01-02 NOTE — TELEPHONE ENCOUNTER
[facility], says a peer to peer Is needed for ct of the neck    You need to call [phone] use patient insurance Id number as case number.     Pt is scheduled for 1/6/2020

## 2020-01-03 NOTE — TELEPHONE ENCOUNTER
Please let the pt know: I placed the Ultrasound order in place of the CT due to her insurance not covering the CT.     Dr. Jie Garza  Internists of [facility], [address] [place] [address], [address] [place] [address] Str.  Phone: [phone]  Fax: [phone]

## 2020-01-03 NOTE — TELEPHONE ENCOUNTER
Will her insurance cover a neck ultrasound?     Dr. Noam Duckworth  Internists of [facility], [address] [facility], [address]  Phone: [phone]  Fax: [phone]

## 2020-01-06 NOTE — TELEPHONE ENCOUNTER
Chief Complaint   Patient presents with    New Order     US Thyroid/Parathyroid per Dr Frannie Simental has been placed for the patient to have done, due to the CT Neck Soft Tissue was not covered by patient's insurance     01- Patient reached and 2 identifiers were used: Full Name, and Date of Birth verified. The patient informed, and given the [facility]  number [phone] for her to call and schedule the test.  The patient has an upcoming follow up scheduled with Dr Frannie Simental on 01-, and understands if she can have this test done now the results will be available at that follow up to discuss.

## 2020-01-17 ENCOUNTER — HOSPITAL ENCOUNTER (OUTPATIENT)
Dept: ULTRASOUND IMAGING | Age: 25
Discharge: HOME | End: 2020-01-17
Attending: INTERNAL MEDICINE
Payer: MEDICAID

## 2020-01-17 DIAGNOSIS — R59.0 CERVICAL LYMPHADENOPATHY: ICD-10-CM

## 2020-01-17 PROCEDURE — 76536 US EXAM OF HEAD AND NECK: CPT

## 2020-01-20 ENCOUNTER — TELEPHONE (OUTPATIENT)
Dept: INTERNAL MEDICINE CLINIC | Age: 25
End: 2020-01-20

## 2020-01-20 DIAGNOSIS — R59.0 CERVICAL LYMPHADENOPATHY: Primary | ICD-10-CM

## 2020-01-20 NOTE — TELEPHONE ENCOUNTER
Patient returned call and is aware of results. Patient given number to [facility]/hematology.

## 2020-01-20 NOTE — TELEPHONE ENCOUNTER
----- Message from Hudson Kelly MD sent at 1/20/2020  1:46 PM EST -----  Please let the patient know that her thyroid ultrasound shows enlarged lymph nodes. I am referring her to Hematology for evaluation.     Dr. Antonio Chavez  [facility]ists of [facility], [address] [place] [address], [address] [place] [address] Str.  Phone: [phone]  Fax: [phone]

## 2020-01-27 NOTE — PROGRESS NOTES
Hematology/Oncology Consultation Note    Name: Benny Auguste  Date: 2020  : 1995    Jimenez Culp MD         Subjective:     Chief complaint: enlarged lymph nodes    History of Present Illness:   Ms. James Farmer is a most pleasant 27y.o. year old female who was seen for enlarged lymph nodes. The patient was accompanied by her mother during this visit. The patient has a past medical history significant of asthma, chronic sinusitis, and depression. She has had chronic sinusitis with stuffy nose and tonsillitis which have been treated with on-off antibiotics for years. She also had history of mononucleosis infected in the past, probably at the age of [de-identified] per family. No hx of cancer noted. She has noticed enlarged lymph nodes on right sided neck for at least a year with minimal increasing in size, mobile, and intermittently tenderness. Her CT Neck 6/10/2018 revealed diffuse bilateral soft tissue neck/cervical chain lymphadenopathy with largest lymph node on the right side measuring approximately 2.0 x 1.5 cm. Limited US Neck 2020 showed soft tissue echotexture structures measured in the neck bilaterally are suspect for lymph nodes. She also noted some swollen lymph nodes in groin areas and left elbows which happed few months ago and all went away after one week duration. Denied other swollen lymph nodes at this time. She denied any fever, chills, drenching night sweat, bleeding or bruising, thrombosis, or weight loss. Denied history of travel or sick contact. She denied headache, skin rashes, worsen cough, dyspnea, nausea, vomiting, swallowing issues, abdominal pain, diarrhea, focal weakness or numbness. SH: Tobacco smoking 1ppd for about 4 years    FH: Denied family hx of blood diseases or cancers      Oncologic History:   No history exists.        Past Medical History, Family History, and Social History:    Past Medical History:   Diagnosis Date    Asthma     Depression     Knee sprain Right knee     History reviewed. No pertinent surgical history. Social History     Socioeconomic History    Marital status: SINGLE     Spouse name: Not on file    Number of children: Not on file    Years of education: Not on file    Highest education level: Not on file   Occupational History    Not on file   Social Needs    Financial resource strain: Not on file    Food insecurity:     Worry: Not on file     Inability: Not on file    Transportation needs:     Medical: Not on file     Non-medical: Not on file   Tobacco Use    Smoking status: Current Every Day Smoker     Packs/day: 1.00    Smokeless tobacco: Never Used    Tobacco comment: Vaping    Substance and Sexual Activity    Alcohol use:  Yes     Alcohol/week: 3.0 standard drinks     Types: 3 Glasses of wine per week    Drug use: Yes     Types: Marijuana     Comment: on occasion    Sexual activity: Yes     Partners: Male     Birth control/protection: Condom   Lifestyle    Physical activity:     Days per week: Not on file     Minutes per session: Not on file    Stress: Not on file   Relationships    Social connections:     Talks on phone: Not on file     Gets together: Not on file     Attends [employer] service: Not on file     Active member of club or organization: Not on file     Attends meetings of clubs or organizations: Not on file     Relationship status: Not on file    Intimate partner violence:     Fear of current or ex partner: Not on file     Emotionally abused: Not on file     Physically abused: Not on file     Forced sexual activity: Not on file   Other Topics Concern    Not on file   Social History Narrative    ** Merged History Encounter **          Family History   Problem Relation Age of Onset    Obesity Mother     Obesity Father     No Known Problems Brother     Obesity Maternal Grandmother     Arthritis-rheumatoid Maternal Grandmother      Current Outpatient Medications   Medication Sig Dispense Refill    melatonin 3 mg tablet Take  by mouth.  loratadine (CLARITIN) 10 mg tablet Take 10 mg by mouth.  fluticasone propionate (FLOVENT HFA) 110 mcg/actuation inhaler Take 2 Puffs by inhalation every twelve (12) hours. 1 Inhaler 11    sertraline (ZOLOFT) 50 mg tablet Take 50 mg by mouth daily.  albuterol (PROVENTIL HFA, VENTOLIN HFA, PROAIR HFA) 90 mcg/actuation inhaler Take 2 Puffs by inhalation every four (4) hours as needed for Wheezing. 1 Inhaler 1    pseudoephedrine (SUDAFED) 30 mg tablet Take 30 mg by mouth daily as needed for Congestion.  guaiFENesin ER (MUCINEX) 600 mg ER tablet Take 600 mg by mouth two (2) times a day. Review of Systems:  Constitutional:   Fever: Negative, Chills: Negative, Weight Loss: Negative, Malaise/Fatigue: positive   Diaphoresis: Negative  Skin:   Rash: Negative,  Itching: Negative  HENT:   Headache:Negative, Hearing Loss: Negative, Tinnitus: Negative, Ear Pain: Negative.  Swollen Lymph Nodes: positive, posterior right neck   Nosebleeds: Negative, Stridor: Negative, Sore Throat: Negative, Stuffy Nose: positive  Eyes:   Jaudice: negative Blurred Vision: Negative, Double Vision: Negative, Photophobia: Negative   Eye Discharge: Negative, Eye Redness: Negative  Cardiovascular:    Chest Pain: Negative, Palpitations: Negative, Orthopnea: Negative    Leg Swelling: Negative PND: Negative  Respiratory:   Cough: Negative, Hemoptysis: Negative, Sputum Production: Negative   Shortness of Breath: Negative, Wheezing: Negative  Gastrointestinal:   Heartburn: Negative, Nausea: Negative, Vomiting: Negative   Abdominal Pain: Negative, Diarrhea: Negative, Constipation: Negative   Blood in Stool: Negative, Melena: Negative   Genitourinary:    Dysuria: Negative, Urgency: Negative, Frequency: Negative   Hematuria: Negative, Flank Pain: Negative  Musculoskeletal:    Myalgias: Negative, Neck Pain: Negative, Back Pain: Negative   Joint Pain: Negative, Falls: Negative  Endo/Heme:    Easy Bruise/Blood: Negative, Polydipsia: Negative, sweats: negative  Neurological:    Dizziness: Negative, Tingling: Negative. Tremor: Negative,    Sensory Change: Negative. Speech Change: Negative, Focal Weakness: Negative     Seizures: Negative, LOC: Negative  Psychiatric:   Depression: Negative, Suicidal Ideas: Negative,  Hallucinations: Negative, Anxious: Negative, Insomnia: Negative, Memory Loss: Negative       Objective:     Visit Vitals  /68 (BP 1 Location: Right arm, BP Patient Position: Sitting)   Pulse 82   Temp 97.3 °F (36.3 °C) (Oral)   Resp 18   Ht 5' 6\" (1.676 m)   Wt 75.3 kg (166 lb)   LMP 01/28/2020   SpO2 98%   BMI 26.79 kg/m²       ECOG Performance Status (grade): 0  0 - able to carry on all pre-disease activity w/out restriction  1 - restricted but able to carry out light work  2 - ambulatory and can self- care but unable to carry out work  3 - bed or chair >50% of waking hours  4 - completely disable, total care, confined to bed or chair    Physical Exam:   Gen. Appearance: The patient is in no acute distress. Skin: There is no bruise or rash. HEENT: Inflamed right > left tonsils   Neck: Right posterior lymphadenopathy: mobile, 2 x1 cm, non-tender, no redness  Lungs: Clear to auscultation and percussion; there are no wheezes or rhonchi. Heart: Regular rate and rhythm; there are no murmurs, gallops, or rubs. Abdomen: Bowel sounds are present and normal.  There is no guarding, tenderness, or hepatosplenomegaly. Extremities: There is no clubbing, cyanosis, or edema. Neurologic: There are no focal neurologic deficits. Lymphatics: no other palpable peripheral lymphadenopathy. Musculoskeletal: The patient has full range of motion at all joints. There is no evidence of joint deformity or effusions. There is no focal joint tenderness. Psychological/psychiatric: There is no clinical evidence of anxiety, depression, or melancholy.       Diagnostics:      No results found for this or any previous visit. Recent Results (from the past 2016 hour(s))   PAP IG, CT-NG, [address] HPV IVVV([id number], [id number])    Collection Time: 12/27/19 12:00 AM   Result Value Ref Range    Diagnosis Comment     Specimen adequacy Comment     Clinician provided ICD10 Comment     Performed by: Comment     Electronically signed by: Holger     . Stan Cobb Note: Comment     Test methodology Comment     . Comment     Chlamydia, Nuc. Acid Amp. Negative Negative    Gonococcus, Nuc. Acid Amp. Negative Negative   SPECIMEN STATUS REPORT    Collection Time: 12/27/19 12:00 AM   Result Value Ref Range    SPECIMEN STATUS REPORT COMMENT    AMB POC URINE PREGNANCY TEST, VISUAL COLOR COMPARISON    Collection Time: 12/27/19  1:30 PM   Result Value Ref Range    VALID INTERNAL CONTROL POC Yes     HCG urine, Ql. (POC) Negative Negative       CT NECK 6/10/2018    IMPRESSION:    1.  Bilateral palatine tonsillitis.  No evidence for abscess. 2.  Diffuse bulky neck lymphadenopathy, likely reactive.  Followup is recommended. Result Narrative   CT neck with contrast.    INDICATION: Tonsillitis. COMPARISON: None.    ________________________      Axial computed tomography scan was performed from the cavernous sinus to the sternal notch.    Intravenous contrast was administered without complications. Reformatted images in both sagittal and coronal planes were utilized.      One or more dose reduction techniques were used on this CT: automated exposure control, adjustment of the mAs and/or kVp according to patient size, and iterative reconstruction techniques.  The specific techniques used on this CT exam have been documented in the patient's electronic medical record. FINDINGS:    Bilateral and large palatine tonsils.    No focal fluid collections or abscesses. Bilateral parotid and submandibular glands demonstrate normal enhancement and are symmetric.   Diffuse bilateral soft tissue neck/cervical chain lymphadenopathy with largest lymph node on the right side measuring approximately 2.0 x 1.5 cm medial to the right sternocleidomastoid muscle. Epiglottis is within normal limits. Thyroid gland is normal.    Vocal cords are unremarkable.      Review bone algorithm does not demonstrate evidence for fracture, lytic or blastic lesion. Paranasal sinuses are clear.       Limited evaluation of brain parenchyma and orbits do not demonstrate any gross abnormalities.     Lung apices are clear. 1/17/2020 Limited Neck Ultrasound     INDICATION: Palpable lump right submandibular region, additional smaller  palpable nontender area in the right lower neck. Clinical concern for cervical  lymphadenopathy.     COMPARISON: No relevant priors in PACS     TECHNIQUE: Limited real-time ultrasonography of the neck was performed  bilaterally and selected images were submitted for review.     FINDINGS:     Right neck:  Soft tissue echotexture mass suspicious for lymph node in the right  submandibular region measures 2.1 x 0.7 x 1.5 cm. Hypoechoic soft tissue structure in the right neck posterior laterally measures  1.1 x 0.3 x 1.1 cm. Additional ovoid soft tissue echotexture structures  described as level 3 by the sonographer measure 2.1 x 0.5 x 1.8 cm and 1.5 x 0.3  x 0.9 cm. Right neck supraclavicular region heterogeneous structure which may  reflect an additional lymph node measures 1.1 x 0.4 x 1.0 cm.     Left neck:  Soft tissue echotexture mass which may reflect lymph node in the left  submandibular region measures 2.6 x 0.6 x 1.5 cm and another adjacent measures  1.4 x 0.5 by 1.9 cm. An additional lymph node measured by the sonographer  adjacent to the other submandibular lymph nodes is measured as 1.1 x 0.8 x 1.1  cm. Another lymph node described by the sonographer as farther inferiorly  measures 1.4 x 0.4 x 1.5 cm.  A lymph node described as level 3 by the  sonographer measures 1.2 x 0.4 x 2.1 cm.     IMPRESSION  Impression:      Soft tissue echotexture structures measured in the neck bilaterally are suspect  for lymph nodes as described, the largest identified sonographically measuring  up to 2.1 cm long axis in the right neck and 2.6 cm long axis in the left neck.       Findings are not specific and potentially incompletely visualized by ultrasound. If clinical concern persists, contrast-enhanced CT scan might be helpful for  further assessment, recommend placement of a marker over site of palpable  clinical concern at the time of CT, as clinically warranted. Assessment and Plan:                                        1. Enlarged lymph node    -- The patient has a past medical history significant of asthma, chronic sinusitis with stuffy nose and tonsillitis for years. Had history of mononucleosis infected in the past. Inflamed right > left tonsils noted on exams. -- Her CT Neck 6/10/2018 revealed diffuse bilateral soft tissue neck/cervical chain lymphadenopathy with largest lymph node on the right side measuring approximately 2.0 x 1.5 cm. Limited US Neck 1/17/2020 showed soft tissue echotexture structures measured in the neck bilaterally are suspect for lymph nodes. Denied constitutional symptoms such as fever, chills, drenching night sweat, or weight loss. -- Today I have explained to the patient and her family that neck lymphadenopathy could be related to infections involving the head and neck region such as sinusitis/tonsillitis; or due to systemic infections, such as HIV, EBV, HSV, CMV, hepatitis, histoplasmosis, coccidiodomycosis, cryptococcosis, toxoplasmosis, secondary syphilis, Lyme disease, mycobacterial infection); or reactive to systemic inflammation (lupus, serum sickness due to drug, amyloidosis, chronic granulomatous diseases, Kawasaki disease); or cancer (lymphoma, metastatic squamous cell carcinoma from the head and neck region, Castlemans disease). Hard cervical nodes, especially in older smokers, are often due to head and neck carcinomas.  The supraclavicular adenopathy was found associated with malignancy in 34-50% of cases. -- Her neck lymphadenopathy maybe reactive nodes to chronic sinusitis/tonsillitis. I will go ahead to refer her to ENT for an evaluation. We will also send initial work ups to access systemic infections or autoimmune diseases such as HIV, RACHEL, and heterophile test. We will need a repeat CT of neck and chest for a better evaluation and comparison. Further diagnostic evaluation will be guided by results from the initial evaluation. Plan:  -- ENT referral was made  -- Labs today as below  -- CT of neck and chest with contrast    I will see the patient back in clinic in about 2-3 weeks. Always sooner if required. 2. Smoking cessation  Smoking cessation counseling provided today: We reviewed the risks of tobacco use such as increased risk of stroke or cancers. We encouraged the patients to quit smoking and advised about smoke-free environments. Orders Placed This Encounter    CT CHEST SOFT TISSUE NECK W CONT     Standing Status:   Future     Standing Expiration Date:   2/28/2020     Scheduling Instructions:      Please schedule at [facility]     Order Specific Question:   Is Patient Pregnant? Answer:   No     Order Specific Question:   STAT Creatinine as indicated     Answer:    Yes    METABOLIC PANEL, COMPREHENSIVE     Standing Status:   Future     Standing Expiration Date:   1/27/2021    CBC WITH AUTOMATED DIFF     Standing Status:   Future     Standing Expiration Date:   1/27/2021    HIV 1/2 AG/AB, 4TH GENERATION,W RFLX CONFIRM     Standing Status:   Future     Standing Expiration Date:   1/27/2021    SED RATE (ESR)     Standing Status:   Future     Standing Expiration Date:   1/28/2021    C REACTIVE PROTEIN, QT     Standing Status:   Future     Standing Expiration Date:   1/28/2021    RACHEL COMPREHENSIVE PANEL     Standing Status:   Future     Standing Expiration Date:   1/28/2021    MONO SCREEN W/ REFLX EBV Standing Status:   Future     Standing Expiration Date:   1/28/2021    REFERRAL TO ENT-OTOLARYNGOLOGY     Referral Priority:   Routine     Referral Type:   Consultation     Referral Reason:   Specialty Services Required     Referred to Provider:   Denis Bowman MD     Requested Specialty:   Otolaryngology     Number of Visits Requested:   1    melatonin 3 mg tablet     Sig: Take  by mouth. Ms. Eric Joy has a reminder for a \"due or due soon\" health maintenance. I have asked that she contact her primary care provider for follow-up on this health maintenance. All of patient's questions answered to their apparent satisfaction. They verbally show understanding and agreement with the plan. About 43 minutes were spent for this encounter with more than 50% of the time spent in face-to-face counseling and discussing on diagnosis and management plan going forward. I would like to  thank Dr Kate Jacobsen MD  for allowing me to participate in the care of this very pleasant patient. If I can be of further assistance please do not hesitate to call. Sierra Hoffmann MD  1/28/2020            Parts of this document has been produced using Dragon dictation system. Unrecognized errors in transcription may be present. Please do not hesitate to reach out for any questions or clarifications.

## 2020-01-28 ENCOUNTER — OFFICE VISIT (OUTPATIENT)
Dept: ONCOLOGY | Age: 25
End: 2020-01-28

## 2020-01-28 VITALS
RESPIRATION RATE: 18 BRPM | WEIGHT: 166 LBS | HEIGHT: 66 IN | DIASTOLIC BLOOD PRESSURE: 68 MMHG | BODY MASS INDEX: 26.68 KG/M2 | TEMPERATURE: 97.3 F | HEART RATE: 82 BPM | OXYGEN SATURATION: 98 % | SYSTOLIC BLOOD PRESSURE: 118 MMHG

## 2020-01-28 DIAGNOSIS — R59.9 ENLARGED LYMPH NODE: Primary | ICD-10-CM

## 2020-01-28 DIAGNOSIS — Z87.891 SMOKING HISTORY: ICD-10-CM

## 2020-01-28 RX ORDER — LANOLIN ALCOHOL/MO/W.PET/CERES
3 CREAM (GRAM) TOPICAL
COMMUNITY
End: 2022-04-28

## 2020-01-28 NOTE — PATIENT INSTRUCTIONS
Swollen Lymph Nodes: Care Instructions Your Care Instructions Lymph nodes are small, bean-shaped glands throughout the body. They help your body fight germs and infections. Lymph nodes often swell when there is a problem such as an injury, infection, or tumor. · The nodes in your neck, under your chin, or behind your ears may swell when you have a cold or sore throat. · An injury or infection in a leg or foot can make the nodes in your groin swell. · Sometimes medicine can make lymph nodes swell, but this is rare. Treatment depends on what caused your nodes to swell. Usually the nodes return to normal size without a problem. Follow-up care is a key part of your treatment and safety. Be sure to make and go to all appointments, and call your doctor if you are having problems. It's also a good idea to know your test results and keep a list of the medicines you take. How can you care for yourself at home? · Take your medicines exactly as prescribed. Call your doctor if you think you are having a problem with your medicine. · Avoid irritation. ? Do not squeeze or pick at the lump. ? Do not stick a needle in it. · Prevent infection. Do not squeeze, drain, or puncture a painful lump. Doing this can irritate or inflame the lump, push any existing infection deeper into the skin, or cause severe bleeding. · Get extra rest. Slow down just a little from your usual routine. · Drink plenty of fluids, enough so that your urine is light yellow or clear like water. If you have kidney, heart, or liver disease and have to limit fluids, talk with your doctor before you increase the amount of fluids you drink. · Take an over-the-counter pain medicine, such as acetaminophen (Tylenol), ibuprofen (Advil, Motrin), or naproxen (Aleve). Read and follow all instructions on the label. · Do not take two or more pain medicines at the same time unless the doctor told you to.  Many pain medicines have acetaminophen, which is Tylenol. Too much acetaminophen (Tylenol) can be harmful. When should you call for help? Call your doctor now or seek immediate medical care if: 
  · You have worse symptoms of infection, such as: 
? Increased pain, swelling, warmth, or redness. ? Red streaks leading from the area. ? Pus draining from the area. ? A fever.  
 Watch closely for changes in your health, and be sure to contact your doctor if: 
  · Your lymph nodes do not get smaller or do not return to normal.  
  · You do not get better as expected. Where can you learn more? Go to http://eloise-ant.info/. Enter E734 in the search box to learn more about \"Swollen Lymph Nodes: Care Instructions. \" Current as of: June 9, 2019 Content Version: 12.2 © 6316-1603 [employer]. Care instructions adapted under license by [employer] (which disclaims liability or warranty for this information). If you have questions about a medical condition or this instruction, always ask your healthcare professional. [place] [address] any warranty or liability for your use of this information.

## 2020-01-28 NOTE — LETTER
1/28/20 Patient: Elizabeth Riggins YOB: 1995 Date of Visit: 1/28/2020 [facility] [address] [id number] [place] [address] VIA In Basket Dear Nerissa Combs MD, Thank you for referring Ms. Dolly Nicholas to [facility] for evaluation. My notes for this consultation are attached. If you have questions, please do not hesitate to call me. I look forward to following your patient along with you. Sincerely, Elida Long MD

## 2020-02-11 ENCOUNTER — HOSPITAL ENCOUNTER (OUTPATIENT)
Dept: CT IMAGING | Age: 25
Discharge: HOME | End: 2020-02-11
Attending: INTERNAL MEDICINE
Payer: MEDICAID

## 2020-02-11 DIAGNOSIS — R59.9 ENLARGED LYMPH NODE: ICD-10-CM

## 2020-02-11 PROCEDURE — 74011636320 HC RX REV CODE- 636/320: Performed by: INTERNAL MEDICINE

## 2020-02-11 PROCEDURE — 70491 CT SOFT TISSUE NECK W/DYE: CPT

## 2020-02-11 RX ADMIN — IOPAMIDOL 80 ML: 612 INJECTION, SOLUTION INTRAVENOUS at 15:19

## 2020-02-20 ENCOUNTER — HOSPITAL ENCOUNTER (OUTPATIENT)
Dept: LAB | Age: 25
Discharge: HOME | End: 2020-02-20

## 2020-02-20 LAB — XX-LABCORP SPECIMEN COL,LCBCF: NORMAL

## 2020-02-20 PROCEDURE — 99001 SPECIMEN HANDLING PT-LAB: CPT

## 2020-02-21 LAB
ALBUMIN SERPL-MCNC: 4 G/DL (ref 3.9–5)
ALBUMIN/GLOB SERPL: 1.6 {RATIO} (ref 1.2–2.2)
ALP SERPL-CCNC: 88 IU/L (ref 39–117)
ALT SERPL-CCNC: 10 IU/L (ref 0–32)
AST SERPL-CCNC: 13 IU/L (ref 0–40)
BASOPHILS # BLD AUTO: 0.1 X10E3/UL (ref 0–0.2)
BASOPHILS NFR BLD AUTO: 1 %
BILIRUB SERPL-MCNC: 0.2 MG/DL (ref 0–1.2)
BUN SERPL-MCNC: 8 MG/DL (ref 6–20)
BUN/CREAT SERPL: 14 (ref 9–23)
CALCIUM SERPL-MCNC: 9 MG/DL (ref 8.7–10.2)
CENTROMERE B AB SER-ACNC: <0.2 AI (ref 0–0.9)
CHLORIDE SERPL-SCNC: 102 MMOL/L (ref 96–106)
CHROMATIN AB SERPL-ACNC: <0.2 AI (ref 0–0.9)
CO2 SERPL-SCNC: 24 MMOL/L (ref 20–29)
CREAT SERPL-MCNC: 0.57 MG/DL (ref 0.57–1)
CRP SERPL-MCNC: <1 MG/L (ref 0–10)
DSDNA AB SER-ACNC: 1 IU/ML (ref 0–9)
ENA JO1 AB SER-ACNC: <0.2 AI (ref 0–0.9)
ENA RNP AB SER-ACNC: 0.6 AI (ref 0–0.9)
ENA SCL70 AB SER-ACNC: <0.2 AI (ref 0–0.9)
ENA SM AB SER-ACNC: <0.2 AI (ref 0–0.9)
ENA SS-A AB SER-ACNC: <0.2 AI (ref 0–0.9)
ENA SS-B AB SER-ACNC: <0.2 AI (ref 0–0.9)
EOSINOPHIL # BLD AUTO: 0.3 X10E3/UL (ref 0–0.4)
EOSINOPHIL NFR BLD AUTO: 4 %
ERYTHROCYTE [DISTWIDTH] IN BLOOD BY AUTOMATED COUNT: 11.9 % (ref 11.7–15.4)
ERYTHROCYTE [SEDIMENTATION RATE] IN BLOOD BY WESTERGREN METHOD: 4 MM/HR (ref 0–32)
GLOBULIN SER CALC-MCNC: 2.5 G/DL (ref 1.5–4.5)
GLUCOSE SERPL-MCNC: 82 MG/DL (ref 65–99)
HCT VFR BLD AUTO: 40 % (ref 34–46.6)
HETEROPH AB SER QL LA: NEGATIVE
HGB BLD-MCNC: 13.4 G/DL (ref 11.1–15.9)
HIV 1+2 AB+HIV1 P24 AG SERPL QL IA: NON REACTIVE
IMM GRANULOCYTES # BLD AUTO: 0 X10E3/UL (ref 0–0.1)
IMM GRANULOCYTES NFR BLD AUTO: 0 %
LYMPHOCYTES # BLD AUTO: 2.3 X10E3/UL (ref 0.7–3.1)
LYMPHOCYTES NFR BLD AUTO: 31 %
MCH RBC QN AUTO: 29.5 PG (ref 26.6–33)
MCHC RBC AUTO-ENTMCNC: 33.5 G/DL (ref 31.5–35.7)
MCV RBC AUTO: 88 FL (ref 79–97)
MONOCYTES # BLD AUTO: 0.5 X10E3/UL (ref 0.1–0.9)
MONOCYTES NFR BLD AUTO: 7 %
NEUTROPHILS # BLD AUTO: 4.2 X10E3/UL (ref 1.4–7)
NEUTROPHILS NFR BLD AUTO: 57 %
PLATELET # BLD AUTO: 293 X10E3/UL (ref 150–450)
POTASSIUM SERPL-SCNC: 4.2 MMOL/L (ref 3.5–5.2)
PROT SERPL-MCNC: 6.5 G/DL (ref 6–8.5)
RBC # BLD AUTO: 4.54 X10E6/UL (ref 3.77–5.28)
SEE BELOW, 164869: NORMAL
SODIUM SERPL-SCNC: 139 MMOL/L (ref 134–144)
SPECIMEN STATUS REPORT, ROLRST: NORMAL
WBC # BLD AUTO: 7.4 X10E3/UL (ref 3.4–10.8)

## 2020-02-26 ENCOUNTER — OFFICE VISIT (OUTPATIENT)
Dept: INTERNAL MEDICINE CLINIC | Age: 25
End: 2020-02-26

## 2020-02-26 VITALS
OXYGEN SATURATION: 100 % | HEART RATE: 70 BPM | DIASTOLIC BLOOD PRESSURE: 55 MMHG | WEIGHT: 166.8 LBS | BODY MASS INDEX: 26.81 KG/M2 | SYSTOLIC BLOOD PRESSURE: 111 MMHG | TEMPERATURE: 97.6 F | RESPIRATION RATE: 12 BRPM | HEIGHT: 66 IN

## 2020-02-26 DIAGNOSIS — R59.1 HEAD AND NECK LYMPHADENOPATHY: ICD-10-CM

## 2020-02-26 DIAGNOSIS — Z72.0 TOBACCO ABUSE: ICD-10-CM

## 2020-02-26 DIAGNOSIS — J45.40 MODERATE PERSISTENT ASTHMA WITHOUT COMPLICATION: Primary | ICD-10-CM

## 2020-02-26 RX ORDER — FLUTICASONE PROPIONATE 110 UG/1
2 AEROSOL, METERED RESPIRATORY (INHALATION) EVERY 12 HOURS
Qty: 1 INHALER | Refills: 11 | Status: SHIPPED | OUTPATIENT
Start: 2020-02-26 | End: 2022-05-05 | Stop reason: SDUPTHER

## 2020-02-26 RX ORDER — ALBUTEROL SULFATE 90 UG/1
2 AEROSOL, METERED RESPIRATORY (INHALATION)
Qty: 1 INHALER | Refills: 5 | Status: SHIPPED | OUTPATIENT
Start: 2020-02-26 | End: 2022-04-28 | Stop reason: SDUPTHER

## 2020-02-26 NOTE — PROGRESS NOTES
Hematology/Oncology Note    Name: Elizabeth Riggins  Date: 2020  : 1995    Tulio Mccord MD         Subjective:     Chief complaint: follow up enlarged lymph nodes    History of Present Illness: The patient presents here for a follow-up. The patient was accompanied by her mother during that visit. She denied any new symptoms or concerns since last visit. Her voice still sounds blocked. Denied any neck pain or drooling or swallowing issues. She already scheduled to see ENT in 2020. Denied any fever, chills, drenching night sweat, bleeding or bruising, thrombosis, or weight loss. She denied headache, skin rashes, worsen cough, dyspnea, nausea, vomiting, swallowing issues, abdominal pain, diarrhea, focal weakness or numbness. History:  Ms. Herminio Longoria is a most pleasant 23y.o. year old female who was seen initially for enlarged lymph nodes. The patient has a past medical history significant of asthma, chronic sinusitis, and depression. She has had chronic sinusitis with stuffy nose and tonsillitis which have been treated with on-off antibiotics for years. She also had history of mononucleosis infected in the past, probably at the age of [de-identified] per family. No hx of cancer noted. She has noticed enlarged lymph nodes on right sided neck for at least a year with minimal increasing in size, mobile, and intermittently tenderness. Her CT Neck 6/10/2018 revealed diffuse bilateral soft tissue neck/cervical chain lymphadenopathy with largest lymph node on the right side measuring approximately 2.0 x 1.5 cm. Limited US Neck 2020 showed soft tissue echotexture structures measured in the neck bilaterally are suspect for lymph nodes. She also noted some swollen lymph nodes in groin areas and left elbows which happed few months ago and all went away after one week duration. Denied other swollen lymph nodes at this time.     SH: Tobacco smoking 1ppd for about 4 years    FH: Denied family hx of blood diseases or cancers      Oncologic History:   No history exists. Past Medical History, Family History, and Social History:    Past Medical History:   Diagnosis Date    Asthma     Depression     Knee sprain     Right knee     History reviewed. No pertinent surgical history. Social History     Socioeconomic History    Marital status: SINGLE     Spouse name: Not on file    Number of children: Not on file    Years of education: Not on file    Highest education level: Not on file   Occupational History    Not on file   Social Needs    Financial resource strain: Not on file    Food insecurity:     Worry: Not on file     Inability: Not on file    Transportation needs:     Medical: Not on file     Non-medical: Not on file   Tobacco Use    Smoking status: Current Every Day Smoker     Packs/day: 1.00    Smokeless tobacco: Never Used    Tobacco comment: Vaping    Substance and Sexual Activity    Alcohol use:  Yes     Alcohol/week: 3.0 standard drinks     Types: 3 Glasses of wine per week    Drug use: Yes     Types: Marijuana     Comment: on occasion    Sexual activity: Yes     Partners: Male     Birth control/protection: Condom   Lifestyle    Physical activity:     Days per week: Not on file     Minutes per session: Not on file    Stress: Not on file   Relationships    Social connections:     Talks on phone: Not on file     Gets together: Not on file     Attends Islam service: Not on file     Active member of club or organization: Not on file     Attends meetings of clubs or organizations: Not on file     Relationship status: Not on file    Intimate partner violence:     Fear of current or ex partner: Not on file     Emotionally abused: Not on file     Physically abused: Not on file     Forced sexual activity: Not on file   Other Topics Concern    Not on file   Social History Narrative    ** Merged History Encounter **          Family History   Problem Relation Age of Onset    Obesity Mother    Tonny Monterroso Obesity Father     No Known Problems Brother     Obesity Maternal Grandmother     Arthritis-rheumatoid Maternal Grandmother      Current Outpatient Medications   Medication Sig Dispense Refill    albuterol (PROVENTIL HFA, VENTOLIN HFA, PROAIR HFA) 90 mcg/actuation inhaler Take 2 Puffs by inhalation every four (4) hours as needed for Wheezing. 1 Inhaler 5    fluticasone propionate (FLOVENT HFA) 110 mcg/actuation inhaler Take 2 Puffs by inhalation every twelve (12) hours. 1 Inhaler 11    melatonin 3 mg tablet Take 3 mg by mouth daily as needed.  loratadine (CLARITIN) 10 mg tablet Take 10 mg by mouth.  sertraline (ZOLOFT) 50 mg tablet Take 50 mg by mouth daily. Review of Systems:  Constitutional:   Fever: Negative, Chills: Negative, Weight Loss: Negative,   Diaphoresis: Negative  Skin:   Rash: Negative,  Itching: Negative  HENT:   Headache:Negative, Hearing Loss: Negative, Tinnitus: Negative, Ear Pain: Negative.  Swollen Lymph Nodes: positive, posterior right neck   Nosebleeds: Negative, Stridor: Negative, Sore Throat: Negative, Stuffy Nose: positive  Eyes:   Jaudice: negative Blurred Vision: Negative, Double Vision: Negative, Photophobia: Negative   Eye Discharge: Negative, Eye Redness: Negative  Cardiovascular:    Chest Pain: Negative, Palpitations: Negative, Orthopnea: Negative    Leg Swelling: Negative PND: Negative  Respiratory:   Cough: Negative, Hemoptysis: Negative, Sputum Production: Negative   Shortness of Breath: Negative, Wheezing: Negative  Gastrointestinal:   Heartburn: Negative, Nausea: Negative, Vomiting: Negative   Abdominal Pain: Negative, Diarrhea: Negative, Constipation: Negative   Blood in Stool: Negative, Melena: Negative   Genitourinary:    Dysuria: Negative, Urgency: Negative, Frequency: Negative   Hematuria: Negative, Flank Pain: Negative  Musculoskeletal:    Myalgias: Negative, Neck Pain: Negative, Back Pain: Negative   Joint Pain: Negative, Falls: Negative  Endo/Heme: Easy Bruise/Blood: Negative, Polydipsia: Negative, sweats: negative  Neurological:    Dizziness: Negative, Tingling: Negative. Tremor: Negative,    Sensory Change: Negative. Speech Change: Negative, Focal Weakness: Negative     Seizures: Negative, LOC: Negative  Psychiatric:   Depression: Negative, Suicidal Ideas: Negative,  Hallucinations: Negative, Anxious: Negative, Insomnia: Negative, Memory Loss: Negative       Objective:     Visit Vitals  /64 (BP 1 Location: Left arm, BP Patient Position: Sitting)   Pulse 77   Temp 97.5 °F (36.4 °C) (Oral)   Resp 18   Ht 5' 6\" (1.676 m)   Wt 77.7 kg (171 lb 6.4 oz)   LMP 02/24/2020   SpO2 99%   BMI 27.66 kg/m²       ECOG Performance Status (grade): 0  0 - able to carry on all pre-disease activity w/out restriction  1 - restricted but able to carry out light work  2 - ambulatory and can self- care but unable to carry out work  3 - bed or chair >50% of waking hours  4 - completely disable, total care, confined to bed or chair    Physical Exam:   Gen. Appearance: The patient is in no acute distress. Skin: There is no bruise or rash. HEENT: enlatrged right > left tonsils   Neck: Right posterior lymphadenopathy: mobile, 1.5x1 cm, non-tender, no redness  Lungs: Clear to auscultation and percussion; there are no wheezes or rhonchi. Heart: Regular rate and rhythm; there are no murmurs, gallops, or rubs. Abdomen: Bowel sounds are present and normal.  There is no guarding, tenderness, or hepatosplenomegaly. Extremities: There is no clubbing, cyanosis, or edema. Neurologic: There are no focal neurologic deficits. Lymphatics: no other palpable peripheral lymphadenopathy. Musculoskeletal: The patient has full range of motion at all joints. There is no evidence of joint deformity or effusions. There is no focal joint tenderness. Psychological/psychiatric: There is no clinical evidence of anxiety, depression, or melancholy.       Diagnostics:      No results found for this or any previous visit. Recent Results (from the past 2016 hour(s))   PAP IG, CT-NG, [address] HPV ABRP([id number], [id number])    Collection Time: 12/27/19 12:00 AM   Result Value Ref Range    Diagnosis Comment     Specimen adequacy Comment     Clinician provided ICD10 Comment     Performed by: Comment     Electronically signed by: Comment     . Gildardo Montelongo Note: Comment     Test methodology Comment     . Comment     Chlamydia, Nuc. Acid Amp. Negative Negative    Gonococcus, Nuc. Acid Amp. Negative Negative   SPECIMEN STATUS REPORT    Collection Time: 12/27/19 12:00 AM   Result Value Ref Range    SPECIMEN STATUS REPORT COMMENT    AMB POC URINE PREGNANCY TEST, VISUAL COLOR COMPARISON    Collection Time: 12/27/19  1:30 PM   Result Value Ref Range    VALID INTERNAL CONTROL POC Yes     HCG urine, Ql. (POC) Negative Negative   CBC WITH AUTOMATED DIFF    Collection Time: 02/20/20 12:00 AM   Result Value Ref Range    WBC 7.4 3.4 - 10.8 x10E3/uL    RBC 4.54 3.77 - 5.28 x10E6/uL    HGB 13.4 11.1 - 15.9 g/dL    HCT 40.0 34.0 - 46.6 %    MCV 88 79 - 97 fL    MCH 29.5 26.6 - 33.0 pg    MCHC 33.5 31.5 - 35.7 g/dL    RDW 11.9 11.7 - 15.4 %    PLATELET 262 361 - 457 x10E3/uL    NEUTROPHILS 57 Not Estab. %    Lymphocytes 31 Not Estab. %    MONOCYTES 7 Not Estab. %    EOSINOPHILS 4 Not Estab. %    BASOPHILS 1 Not Estab. %    ABS. NEUTROPHILS 4.2 1.4 - 7.0 x10E3/uL    Abs Lymphocytes 2.3 0.7 - 3.1 x10E3/uL    ABS. MONOCYTES 0.5 0.1 - 0.9 x10E3/uL    ABS. EOSINOPHILS 0.3 0.0 - 0.4 x10E3/uL    ABS. BASOPHILS 0.1 0.0 - 0.2 x10E3/uL    IMMATURE GRANULOCYTES 0 Not Estab. %    ABS. IMM.  GRANS. 0.0 0.0 - 0.1 [id number]   METABOLIC PANEL, COMPREHENSIVE    Collection Time: 02/20/20 12:00 AM   Result Value Ref Range    Glucose 82 65 - 99 mg/dL    BUN 8 6 - 20 mg/dL    Creatinine 0.57 0.57 - 1.00 mg/dL    GFR est non- >59 mL/min/1.73    GFR est  >59 mL/min/1.73    BUN/Creatinine ratio 14 9 - 23    Sodium 139 134 - 144 mmol/L Potassium 4.2 3.5 - 5.2 mmol/L    Chloride 102 96 - 106 mmol/L    CO2 24 20 - 29 mmol/L    Calcium 9.0 8.7 - 10.2 mg/dL    Protein, total 6.5 6.0 - 8.5 g/dL    Albumin 4.0 3.9 - 5.0 g/dL    GLOBULIN, TOTAL 2.5 1.5 - 4.5 g/dL    A-G Ratio 1.6 1.2 - 2.2    Bilirubin, total 0.2 0.0 - 1.2 mg/dL    Alk. phosphatase 88 39 - 117 IU/L    AST (SGOT) 13 0 - 40 IU/L    ALT (SGPT) 10 0 - 32 IU/L   RACHEL COMPREHENSIVE PANEL    Collection Time: 02/20/20 12:00 AM   Result Value Ref Range    Anti-DNA (DS) Ab, QT 1 0 - 9 IU/mL    RNP Abs 0.6 0.0 - 0.9 AI    Alfaro Abs <0.2 0.0 - 0.9 AI    Scleroderma-70 Ab <0.2 0.0 - 0.9 AI    Sjogren's Anti-SS-A <0.2 0.0 - 0.9 AI    Sjogren's Anti-SS-B <0.2 0.0 - 0.9 AI    Antichromatin Ab <0.2 0.0 - 0.9 AI    Anti-Mai-1 <0.2 0.0 - 0.9 AI    Centromere B Ab <0.2 0.0 - 0.9 AI    See below Comment    HIV 1/2 AG/AB, 4TH GENERATION,W RFLX CONFIRM    Collection Time: 02/20/20 12:00 AM   Result Value Ref Range    HIV SCREEN 4TH GENERATION WRFX Non Reactive Non Reactive   SED RATE (ESR)    Collection Time: 02/20/20 12:00 AM   Result Value Ref Range    Sed rate (ESR) 4 0 - 32 mm/hr   C REACTIVE PROTEIN, QT    Collection Time: 02/20/20 12:00 AM   Result Value Ref Range    C-Reactive Protein, Qt <1 0 - 10 mg/L   MONO SCREEN W/ REFLX EBV    Collection Time: 02/20/20 12:00 AM   Result Value Ref Range    Mono, qual. Negative Negative   SPECIMEN STATUS REPORT    Collection Time: 02/20/20 12:00 AM   Result Value Ref Range    SPECIMEN STATUS REPORT COMMENT    [facility] SPECIMEN COL    Collection Time: 02/20/20 12:50 PM   Result Value Ref Range    [facility]. [facility] Please direct inquiries to ([phone]). CT NECK 6/10/2018    IMPRESSION:    1.  Bilateral palatine tonsillitis.  No evidence for abscess. 2.  Diffuse bulky neck lymphadenopathy, likely reactive.  Followup is recommended.    Result Narrative   CT neck with contrast.    INDICATION: Tonsillitis. COMPARISON: None.    ________________________      Axial computed tomography scan was performed from the cavernous sinus to the sternal notch.    Intravenous contrast was administered without complications. Reformatted images in both sagittal and coronal planes were utilized.      One or more dose reduction techniques were used on this CT: automated exposure control, adjustment of the mAs and/or kVp according to patient size, and iterative reconstruction techniques.  The specific techniques used on this CT exam have been documented in the patient's electronic medical record. FINDINGS:    Bilateral and large palatine tonsils.    No focal fluid collections or abscesses. Bilateral parotid and submandibular glands demonstrate normal enhancement and are symmetric. Diffuse bilateral soft tissue neck/cervical chain lymphadenopathy with largest lymph node on the right side measuring approximately 2.0 x 1.5 cm medial to the right sternocleidomastoid muscle. Epiglottis is within normal limits. Thyroid gland is normal.    Vocal cords are unremarkable.      Review bone algorithm does not demonstrate evidence for fracture, lytic or blastic lesion. Paranasal sinuses are clear.       Limited evaluation of brain parenchyma and orbits do not demonstrate any gross abnormalities.     Lung apices are clear. 1/17/2020 Limited Neck Ultrasound     INDICATION: Palpable lump right submandibular region, additional smaller  palpable nontender area in the right lower neck. Clinical concern for cervical  lymphadenopathy.     COMPARISON: No relevant priors in PACS     TECHNIQUE: Limited real-time ultrasonography of the neck was performed  bilaterally and selected images were submitted for review.     FINDINGS:     Right neck:  Soft tissue echotexture mass suspicious for lymph node in the right  submandibular region measures 2.1 x 0.7 x 1.5 cm.   Hypoechoic soft tissue structure in the right neck posterior laterally measures  1.1 x 0.3 x 1.1 cm. Additional ovoid soft tissue echotexture structures  described as level 3 by the sonographer measure 2.1 x 0.5 x 1.8 cm and 1.5 x 0.3  x 0.9 cm. Right neck supraclavicular region heterogeneous structure which may  reflect an additional lymph node measures 1.1 x 0.4 x 1.0 cm.     Left neck:  Soft tissue echotexture mass which may reflect lymph node in the left  submandibular region measures 2.6 x 0.6 x 1.5 cm and another adjacent measures  1.4 x 0.5 by 1.9 cm. An additional lymph node measured by the sonographer  adjacent to the other submandibular lymph nodes is measured as 1.1 x 0.8 x 1.1  cm. Another lymph node described by the sonographer as farther inferiorly  measures 1.4 x 0.4 x 1.5 cm. A lymph node described as level 3 by the  sonographer measures 1.2 x 0.4 x 2.1 cm.     IMPRESSION  Impression:      Soft tissue echotexture structures measured in the neck bilaterally are suspect  for lymph nodes as described, the largest identified sonographically measuring  up to 2.1 cm long axis in the right neck and 2.6 cm long axis in the left neck.       Findings are not specific and potentially incompletely visualized by ultrasound. If clinical concern persists, contrast-enhanced CT scan might be helpful for  further assessment, recommend placement of a marker over site of palpable  clinical concern at the time of CT, as clinically warranted. CT NECK 2/11/2020  IMPRESSION:     1. Skin marker placed over the right posterior mid neck over the area of  concern. Benign-appearing 10 x 5 mm subcutaneous lymph node underlying the skin  marker appears benign. No suspicious lymph nodes in the neck.     2. Some mild prominence of the lymphoid tissue in Waldeyer's ring, not unusual  for patient age.         Assessment and Plan:                                        1. Enlarged lymph node    2.  Smoking history     -- The patient has a past medical history significant of asthma, chronic sinusitis with stuffy nose and tonsillitis for years. Had history of mononucleosis infected in the past. Enlarged right > left tonsils noted on previous exams. -- Her CT Neck 6/10/2018 revealed diffuse bilateral soft tissue neck/cervical chain lymphadenopathy with largest lymph node on the right side measuring approximately 2.0 x 1.5 cm. Limited US Neck 1/17/2020 showed soft tissue echotexture structures measured in the neck bilaterally are suspect for lymph nodes. Denied constitutional symptoms such as fever, chills, drenching night sweat, or weight loss. -- Repeat CT of neck 2/11/2020 showed a benign-appearing 10 x 5 mm subcutaneous lymph node underlying the skin marker appears benign. No suspicious lymph nodes in the neck. Some mild prominence of the lymphoid tissue in Waldeyer's ring, not unusual for patient age. -- Today I have reviewed with her and the family about her lab results and CT of neck. Lab showed unremarkable ESR/CRP, RACHEL panel, HIV, and Mono screen. Repeat CT showed a small benign-appearing 1 x 0.5cm, no suspicious lymph nodes in the neck. Her lymphadenopathy could be a reactive process from chronic sinusitis/tonsillitis. We can consider repeat US of neck in 3-4 months . -- I advised the patient should see ENT for consultation. -- I will see the patient back in clinic in about 3 months for a follow-up. Always sooner if required. All of patient's questions answered to their apparent satisfaction. They verbally show understanding and agreement with the plan. About 25 minutes were spent for this encounter with more than 50% of the time spent in face-to-face counseling and discussing on diagnosis and management plan going forward. Darryle Blood, MD  2/27/2020            Parts of this document has been produced using Dragon dictation system. Unrecognized errors in transcription may be present. Please do not hesitate to reach out for any questions or clarifications.           CC: Chika Davis MD

## 2020-02-26 NOTE — PROGRESS NOTES
INTERNISTS OF [facility]:  2/26/2020, MRN: [id number]      Paola Shah is a 27 y.o. female and presents to clinic for  Neck Swelling (still on going Right Side of the Neck with Swelling   ROOM  2)    Subjective: The patient is a 65-year-old female with history of moderate persistent asthma, nicotine dependence, dependence, ASCUS, and anxiety/depression. 1. Neck LAD: Since her last appointment, she saw  for evaluation of continued smoking neck lymphadenopathy seen on a CT scan for 2018. She also had a thyroid ultrasound that I ordered. Findings are listed below. Follow-up labs and CT scans were ordered by .  Patient was also referred to ENT for evaluation.      6/10/18 Neck CT: Bilateral palatine tonsillitis.  No evidence for abscess. Diffuse bulky neck lymphadenopathy, likely reactive.  Followup is recommended.     1/17/20 Thyroid Ultrasound: Soft tissue echotexture structures measured in the neck bilaterally are suspect for lymph nodes as described, the largest identified sonographically measuring up to 2.1 cm long axis in the right neck and 2.6 cm long axis in the left neck. 2.  Asthma: Presently asymptomatic. She needs a refill on her albuterol inhaler which she uses rarely. Flovent was added at her last appointment. She is doing much better on this new inhaler. She is vaping every day but is cut back since her last appointment. Patient Active Problem List    Diagnosis Date Noted    Enlarged lymph node 01/28/2020    Moderate persistent asthma without complication 63/65/8656    Atypical squamous cells of undetermined significance (ASCUS) on Papanicolaou smear of cervix 10/21/2019    Chronic pain of right knee 02/08/2019    Tobacco abuse 02/08/2019    Hyperhidrosis 02/08/2019       Current Outpatient Medications   Medication Sig Dispense Refill    melatonin 3 mg tablet Take 3 mg by mouth daily as needed.  loratadine (CLARITIN) 10 mg tablet Take 10 mg by mouth.       fluticasone propionate (FLOVENT HFA) 110 mcg/actuation inhaler Take 2 Puffs by inhalation every twelve (12) hours. 1 Inhaler 11    sertraline (ZOLOFT) 50 mg tablet Take 50 mg by mouth daily.  albuterol (PROVENTIL HFA, VENTOLIN HFA, PROAIR HFA) 90 mcg/actuation inhaler Take 2 Puffs by inhalation every four (4) hours as needed for Wheezing. 1 Inhaler 1       Allergies   Allergen Reactions    Latex Hives    Nickel Rash       Past Medical History:   Diagnosis Date    Asthma     Depression     Knee sprain     Right knee       No past surgical history on file. Family History   Problem Relation Age of Onset    Obesity Mother     Obesity Father     No Known Problems Brother     Obesity Maternal Grandmother     Arthritis-rheumatoid Maternal Grandmother        Social History     Tobacco Use    Smoking status: Current Every Day Smoker     Packs/day: 1.00    Smokeless tobacco: Never Used    Tobacco comment: Vaping    Substance Use Topics    Alcohol use: Yes     Alcohol/week: 3.0 standard drinks     Types: 3 Glasses of wine per week       ROS   Review of Systems   Constitutional: Negative for chills and fever. HENT: Negative for ear pain and sore throat. Eyes: Negative for blurred vision and pain. Respiratory: Negative for cough and shortness of breath. Cardiovascular: Negative for chest pain. Gastrointestinal: Negative for abdominal pain, blood in stool and melena. Genitourinary: Negative for dysuria and hematuria. Musculoskeletal: Negative for joint pain and myalgias. Skin: Negative for rash. Neurological: Negative for tingling, focal weakness and headaches. Endo/Heme/Allergies: Does not bruise/bleed easily. Psychiatric/Behavioral: Negative for substance abuse.        Objective     Vitals:    02/26/20 1035   BP: 111/55   Pulse: 70   Resp: 12   Temp: 97.6 °F (36.4 °C)   TempSrc: Oral   SpO2: 100%   Weight: 166 lb 12.8 oz (75.7 kg)   Height: 5' 6\" (1.676 m)   PainSc:   0 - No pain LMP: 02/24/2020       Physical Exam  Vitals signs and nursing note reviewed. HENT:      Head: Normocephalic and atraumatic. Right Ear: External ear normal.      Left Ear: External ear normal.      Nose: Nose normal.      Mouth/Throat:      Pharynx: No oropharyngeal exudate. Eyes:      General: No scleral icterus. Right eye: No discharge. Left eye: No discharge. Conjunctiva/sclera: Conjunctivae normal.   Neck:      Musculoskeletal: Neck supple. Cardiovascular:      Rate and Rhythm: Normal rate and regular rhythm. Heart sounds: Normal heart sounds. No murmur. No friction rub. No gallop. Pulmonary:      Effort: Pulmonary effort is normal. No respiratory distress. Breath sounds: Normal breath sounds. No wheezing or rales. Chest:      Chest wall: No tenderness. Abdominal:      General: Bowel sounds are normal. There is no distension. Palpations: Abdomen is soft. There is no mass. Tenderness: There is no abdominal tenderness. There is no guarding or rebound. Musculoskeletal:         General: No swelling (Bue) or tenderness (Bue). Lymphadenopathy:      Cervical: No cervical adenopathy. Skin:     General: Skin is warm and dry. Findings: No erythema. Neurological:      Mental Status: She is alert and oriented to person, place, and time. Motor: No abnormal muscle tone. Gait: Gait is intact.  Gait normal.   Psychiatric:         Mood and Affect: Mood and affect normal.         LABS   Data Review:   Lab Results   Component Value Date/Time    WBC 7.4 02/20/2020 12:00 AM    HGB 13.4 02/20/2020 12:00 AM    HCT 40.0 02/20/2020 12:00 AM    PLATELET 621 22/35/1433 12:00 AM    MCV 88 02/20/2020 12:00 AM       Lab Results   Component Value Date/Time    Sodium 139 02/20/2020 12:00 AM    Potassium 4.2 02/20/2020 12:00 AM    Chloride 102 02/20/2020 12:00 AM    CO2 24 02/20/2020 12:00 AM    Glucose 82 02/20/2020 12:00 AM    BUN 8 02/20/2020 12:00 AM Creatinine 0.57 02/20/2020 12:00 AM    BUN/Creatinine ratio 14 02/20/2020 12:00 AM    GFR est  02/20/2020 12:00 AM    GFR est non- 02/20/2020 12:00 AM    Calcium 9.0 02/20/2020 12:00 AM       No results found for: CHOL, CHOLPOCT, CHOLX, CHLST, CHOLV, HDL, HDLPOC, HDLP, LDL, LDLCPOC, LDLC, DLDLP, VLDLC, VLDL, TGLX, TRIGL, TRIGP, TGLPOCT, CHHD, CHHDX    No results found for: HBA1C, HGBE8, MAJ8SNJV, VEE4NYLN    Assessment/Plan:   1. Moderate persistent asthma without complication: Stable. +Vaping.  -Refilling her Flovent and albuterol.  -I encouraged her not to vape/smoke  -I encouraged her to take Flonase as needed for allergic rhinitis symptoms     ORDERS:  - fluticasone propionate (FLOVENT HFA) 110 mcg/actuation inhaler; Take 2 Puffs by inhalation every twelve (12) hours. Dispense: 1 Inhaler; Refill: 11    2. Neck LAD: Must r/o infectious etiologies and malignancy - which was discussed with the pt today.  -I encouraged her to get all tests ordered by  and to follow-up with ENT. Health Maintenance Due   Topic Date Due    HPV Age 9Y-34Y (1 - Female 2-dose series) 02/27/2008    DTaP/Tdap/Td series (1 - Tdap) 02/27/2008     Lab review: labs are reviewed in the EHR    I have discussed the diagnosis with the patient and the intended plan as seen in the above orders. The patient has received an after-visit summary and questions were answered concerning future plans. I have discussed medication side effects and warnings with the patient as well. I have reviewed the plan of care with the patient, accepted their input and they are in agreement with the treatment goals. All questions were answered. The patient understands the plan of care. Handouts provided today with above information. Pt instructed if symptoms worsen to call the office or report to the ED for continued care. Greater than 50% of the visit time was spent in counseling and/or coordination of care.       Voice recognition was used to generate this report, which may have resulted in some phonetic based errors in grammar and contents. Even though attempts were made to correct all the mistakes, some may have been missed, and remained in the body of the document.           Nerissa Combs MD

## 2020-02-26 NOTE — PATIENT INSTRUCTIONS
Health Maintenance Due   Topic Date Due    HPV Age 9Y-34Y (3 - Female 2-dose series) 02/27/2008    DTaP/Tdap/Td series (1 - Tdap) 02/27/2008          Learning About Asthma Triggers  What are asthma triggers? When you have asthma, certain things can make your symptoms worse. These are called triggers. Learn what triggers an asthma attack for you, and avoid the triggers when you can. Common triggers include colds, smoke, air pollution, dust, pollen, pets, stress, and cold air. How do asthma triggers affect you? Triggers can make it harder for your lungs to work as they should. They can lead to sudden breathing problems and other symptoms. When you are around a trigger, an asthma attack is more likely. If your symptoms are severe, you may need emergency treatment or have to go to the hospital for treatment. What can you do to avoid triggers? The first thing is to know your triggers. When you are having symptoms, note the things around you that might be causing them. Then look for patterns that may be triggering your symptoms. Record your triggers on a piece of paper or in an asthma diary. When you have your list of possible triggers, work with your doctor to find ways to avoid them. Avoid colds and flu. Get a pneumococcal vaccine shot. If you have had one before, ask your doctor whether you need a second dose. Get a flu vaccine every year, as soon as it's available. If you must be around people with colds or the flu, wash your hands often. Here are some ways to avoid a few common triggers. · Do not smoke or allow others to smoke around you. If you need help quitting, talk to your doctor about stop-smoking programs and medicines. These can increase your chances of quitting for good. · If there is a lot of pollution, pollen, or dust outside, stay at home and keep your windows closed. Use an air conditioner or air filter in your home.  Check your local weather report or newspaper for air quality and pollen reports. What else should you know? · Take your controller medicine every day, not just when you have symptoms. It helps prevent problems before they occur. · Your doctor may suggest that you check how well your lungs are working by measuring your peak expiratory flow (PEF) throughout the day. Your PEF may drop when you are near things that trigger symptoms. Where can you learn more? Go to http://eloise-ant.info/. Enter R900 in the search box to learn more about \"Learning About Asthma Triggers. \"  Current as of: June 9, 2019  Content Version: 12.2  © 6891-0311 [employer] [place]. Care instructions adapted under license by [employer] (which disclaims liability or warranty for this information). If you have questions about a medical condition or this instruction, always ask your healthcare professional. [address] any warranty or liability for your use of this information.

## 2020-02-26 NOTE — PROGRESS NOTES
Adam Douglas presents today for   Chief Complaint   Patient presents with    Neck Swelling     still on going Right Side of the Neck with Swelling   ROOM  2     Patient will be seen by ENT at the [facility] facility in [facility] scheduled on 03-. Is someone accompanying this pt? Yes, mother    Is the patient using any DME equipment during [address]? no    Depression Screening:  3 most recent PHQ Screens 1/28/2020   PHQ Not Done Active Diagnosis of Depression or Bipolar Disorder   Little interest or pleasure in doing things Not at all   Feeling down, depressed, irritable, or hopeless Not at all   Total Score PHQ 2 0       Learning Assessment:  Learning Assessment 1/28/2020   PRIMARY LEARNER Patient   HIGHEST LEVEL OF EDUCATION - PRIMARY LEARNER  -   BARRIERS PRIMARY LEARNER -   CO-LEARNER CAREGIVER -   PRIMARY LANGUAGE ENGLISH   LEARNER PREFERENCE PRIMARY DEMONSTRATION   ANSWERED BY Patient   RELATIONSHIP SELF       Abuse Screening:  Abuse Screening Questionnaire 1/28/2020   Do you ever feel afraid of your partner? N   Are you in a relationship with someone who physically or mentally threatens you? N   Is it safe for you to go home? Y       Fall Risk  Fall Risk Assessment, last 12 mths 1/28/2020   Able to walk? Yes   Fall in past 12 months? No       Health Maintenance reviewed and discussed and ordered per Provider. Health Maintenance Due   Topic Date Due    HPV Age 9Y-34Y (1 - Female 2-dose series) 02/27/2008    DTaP/Tdap/Td series (1 - Tdap) 02/27/2008   . Coordination of Care:  1. Have you been to the ER, urgent care clinic since your last visit? Hospitalized since your last visit? no    2. Have you seen or consulted any other health care providers outside of the [address] [place] [address] since your last visit? Include any pap smears or colon screening.  no

## 2020-02-27 ENCOUNTER — OFFICE VISIT (OUTPATIENT)
Dept: ONCOLOGY | Age: 25
End: 2020-02-27

## 2020-02-27 VITALS
WEIGHT: 171.4 LBS | HEIGHT: 66 IN | OXYGEN SATURATION: 99 % | DIASTOLIC BLOOD PRESSURE: 64 MMHG | RESPIRATION RATE: 18 BRPM | HEART RATE: 77 BPM | BODY MASS INDEX: 27.55 KG/M2 | SYSTOLIC BLOOD PRESSURE: 106 MMHG | TEMPERATURE: 97.5 F

## 2020-02-27 DIAGNOSIS — Z87.891 SMOKING HISTORY: ICD-10-CM

## 2020-02-27 DIAGNOSIS — R59.9 ENLARGED LYMPH NODE: Primary | ICD-10-CM

## 2020-02-27 NOTE — PROGRESS NOTES
Chip Bonds is a 23 y.o. female presenting today for a follow-up appointment. Patient is ambulatory with no assistive device(s), is accompanied by mother, denies any generalized pain. Patient has no other complaints at this time. Chief Complaint   Patient presents with    Follow-up     Visit Vitals  /64 (BP 1 Location: Left arm, BP Patient Position: Sitting)   Pulse 77   Temp 97.5 °F (36.4 °C) (Oral)   Resp 18   Ht 5' 6\" (1.676 m)   Wt 171 lb 6.4 oz (77.7 kg)   LMP 02/24/2020   SpO2 99%   BMI 27.66 kg/m²     Current Outpatient Medications   Medication Sig    albuterol (PROVENTIL HFA, VENTOLIN HFA, PROAIR HFA) 90 mcg/actuation inhaler Take 2 Puffs by inhalation every four (4) hours as needed for Wheezing.  fluticasone propionate (FLOVENT HFA) 110 mcg/actuation inhaler Take 2 Puffs by inhalation every twelve (12) hours.  melatonin 3 mg tablet Take 3 mg by mouth daily as needed.  loratadine (CLARITIN) 10 mg tablet Take 10 mg by mouth.  sertraline (ZOLOFT) 50 mg tablet Take 50 mg by mouth daily. No current facility-administered medications for this visit. Fall Risk Assessment, last 12 mths 1/28/2020   Able to walk? Yes   Fall in past 12 months? No     3 most recent PHQ Screens 2/27/2020   PHQ Not Done -   Little interest or pleasure in doing things Not at all   Feeling down, depressed, irritable, or hopeless Not at all   Total Score PHQ 2 0     Abuse Screening Questionnaire 1/28/2020   Do you ever feel afraid of your partner? N   Are you in a relationship with someone who physically or mentally threatens you? N   Is it safe for you to go home? Y     Health Maintenance Due   Topic Date Due    HPV Age 9Y-34Y (1 - Female 2-dose series) 02/27/2008    DTaP/Tdap/Td series (1 - Tdap) 02/27/2008    Influenza Age 5 to Adult  08/01/2019       Medications no longer taking/discontinued: none    Patient currently taking Antiplatelet therapy? no    1.  Have you been to the ER, urgent care clinic since your last visit? Hospitalized since your last visit? no     2. Have you seen or consulted any other health care providers outside of the [address] [place] [address] since your last visit? Include any pap smears or colon screening.  no

## 2020-02-27 NOTE — LETTER
2/27/20 Patient: Natalie Sainz YOB: 1995 Date of Visit: 2/27/2020 Nicki Sena [facility] [address] [id number] [address] [place] [address] VIA In Basket Dear Nicki Sena MD, Thank you for referring Ms. Dolly Nicholas to [facility] for evaluation. My notes for this consultation are attached. If you have questions, please do not hesitate to call me. I look forward to following your patient along with you. Sincerely, Marco Armstrong MD

## 2020-03-02 ENCOUNTER — TELEPHONE (OUTPATIENT)
Dept: INTERNAL MEDICINE CLINIC | Age: 25
End: 2020-03-02

## 2020-03-02 NOTE — TELEPHONE ENCOUNTER
Patient is trying to switch psychologist but she's having a hard time finding one. She wants to know you can refer her to someone. She doesn't want to go to [facility].

## 2020-03-02 NOTE — TELEPHONE ENCOUNTER
Patient contacted and notified that there is a list that we can send her in the mail, as she will need to schedule her own appointment.

## 2020-04-22 ENCOUNTER — TELEPHONE (OUTPATIENT)
Dept: INTERNAL MEDICINE CLINIC | Age: 25
End: 2020-04-22

## 2020-04-22 DIAGNOSIS — J03.90 TONSILLITIS: Primary | ICD-10-CM

## 2020-04-22 NOTE — TELEPHONE ENCOUNTER
Pt calling, says she is very frustrated. Had referral to Dr. Diane Noel he couldn't help her. Gave referral to ent at [facility] and was told they were cancer specialists and she needed to see a regular ent. Says she needs Dr. Joseline Snowden to refer her to ent    If you call her please leave info on voicemail. She says she keeps playing phone tag with dr vaengas. She has bad phone service.     Right [place]

## 2020-04-23 NOTE — TELEPHONE ENCOUNTER
Please offer a virtual appointment.     Dr. Abelino Leigh  [facility], [address], [address]  Phone: [phone]  Fax: [phone]

## 2020-04-27 ENCOUNTER — VIRTUAL VISIT (OUTPATIENT)
Dept: INTERNAL MEDICINE CLINIC | Age: 25
End: 2020-04-27

## 2020-04-27 DIAGNOSIS — J30.9 ALLERGIC RHINITIS, UNSPECIFIED SEASONALITY, UNSPECIFIED TRIGGER: Primary | ICD-10-CM

## 2020-04-27 DIAGNOSIS — R59.0 LYMPHADENOPATHY OF HEAD AND NECK REGION: ICD-10-CM

## 2020-04-27 RX ORDER — PREDNISONE 20 MG/1
40 TABLET ORAL
Qty: 10 TAB | Refills: 0 | Status: SHIPPED | OUTPATIENT
Start: 2020-04-27 | End: 2021-12-10

## 2020-04-27 RX ORDER — CETIRIZINE HCL 10 MG
10 TABLET ORAL DAILY
Qty: 90 TAB | Refills: 3 | Status: SHIPPED | OUTPATIENT
Start: 2020-04-27

## 2020-04-27 RX ORDER — FLUTICASONE PROPIONATE 50 MCG
2 SPRAY, SUSPENSION (ML) NASAL DAILY
Qty: 1 BOTTLE | Refills: 11 | Status: SHIPPED | OUTPATIENT
Start: 2020-04-27

## 2020-04-27 NOTE — PROGRESS NOTES
Art Ruiz is a 23 y.o. female who was seen by synchronous (real-time) audio-video technology on 4/27/2020. Assessment & Plan:   Lymphadenopathy of head and neck region: Seen on previous imaging studies. We discussed her recent CT scan and how findings are reassuring. We discussed how  saw tonsillar asymmetry (right tonsil >left tonsil). He recommended f/u imaging in the future. He recommended that she see a general ENT team for her other sx. I discussed my suspicion that her sx are from allergic rhinitis. +H/o asthma. +H/o seasonal allergies. - Flonase and zyrtec ordered. - Prednisone course prescribed for quick sx relief  - Avoidance of allergens discussed. - Referral to ENT for repeat allergy testing? Repeating neck imaging in a year? ORDERS:  - predniSONE (DELTASONE) 20 mg tablet; Take 40 mg by mouth daily (with breakfast). Dispense: 10 Tab; Refill: 0  - fluticasone propionate (FLONASE) 50 mcg/actuation nasal spray; 2 Sprays by Both Nostrils route daily. Dispense: 1 Bottle; Refill: 11  - cetirizine (ZYRTEC) 10 mg tablet; Take 1 Tab by mouth daily. Dispense: 90 Tab; Refill: 3        Lab review: labs are reviewed in the EHR     I have discussed the diagnosis with the patient and the intended plan as seen in the above orders. I have discussed medication side effects and warnings with the patient as well. I have reviewed the plan of care with the patient, accepted their input and they are in agreement with the treatment goals. All questions were answered. The patient understands the plan of care. Pt instructed if symptoms worsen to call the office or report to the ED for continued care. Greater than 50% of the visit time was spent in counseling and/or coordination of care. Voice recognition was used to generate this report, which may have resulted in some phonetic based errors in grammar and contents.  Even though attempts were made to correct all the mistakes, some may have been missed, and remained in the body of the document. Subjective:   Lazara Sherwood was seen for   Chief Complaint   Patient presents with    Follow-up     The patient is a 61-year-old female with history of moderate persistent asthma, nicotine dependence, ASCUS, allergic rhinitis (with h/o allergy testing), and anxiety/depression. Neck LAD and Allergic Rhinitis: The patient was cleared by Hematology after having nonspecific LAD seen on a neck CT in 2018 and in 2020. Findings are listed below. She was also cleared by ENT at [facility]. Incidentally, she reports a chronic cough, post nasal drip, and intermittent sore throat. The exam at [facility] ENT was reassuring except for some tonsilar asymmetry (right tonsil is larger than her left tonsil). The ENT team recommended that she had her right tonsil removed if review of previous scans showed changes in the appearance of her tonsils since 2018. Otherwise, he was unable to ascertain an etiology of her nonspecific sx of post nasal drip, b/l ear discomfort/fullness, and intermittent sore throat sx. Sx have been worsening since her last apt.  with KELLY recommended that she see a general ENT doctor for additional evaluation of these nonspecific sx. She has taken/tried mucinex and sudafed for sx relief. No improvement with these rx. She has a h/o allergy testing in which she found out she is \"allergic to everything. \" +Seasonal allergy sx but does not take an antihistamine or nasal steroid on a daily basis. +H/o asthma (moderate persistent asthma). 6/10/18 Neck CT: Bilateral palatine tonsillitis.  No evidence for abscess. Diffuse bulky neck lymphadenopathy, likely reactive.  Followup is recommended.     1/17/20 Thyroid Ultrasound: Soft tissue echotexture structures measured in the neck bilaterally are suspect for lymph nodes as described, the largest identified sonographically measuring up to 2.1 cm long axis in the right neck and 2.6 cm long axis in the left neck.      2/11/20 Neck CT: Skin marker placed over the right posterior mid neck over the area of concern. Benign-appearing 10 x 5 mm subcutaneous lymph node underlying the skin marker appears benign. No suspicious lymph nodes in the neck. Some mild prominence of the lymphoid tissue in Waldeyer's ring, not unusual for patient age. Prior to Admission medications    Medication Sig Start Date End Date Taking? Authorizing Provider   albuterol (PROVENTIL HFA, VENTOLIN HFA, PROAIR HFA) 90 mcg/actuation inhaler Take 2 Puffs by inhalation every four (4) hours as needed for Wheezing. 2/26/20   Seth Hall MD   fluticasone propionate (FLOVENT HFA) 110 mcg/actuation inhaler Take 2 Puffs by inhalation every twelve (12) hours. 2/26/20   Seth Hall MD   melatonin 3 mg tablet Take 3 mg by mouth daily as needed. Provider, Historical   loratadine (CLARITIN) 10 mg tablet Take 10 mg by mouth. Provider, Historical   sertraline (ZOLOFT) 50 mg tablet Take 50 mg by mouth daily. Provider, Historical     Allergies   Allergen Reactions    Latex Hives    Nickel Rash     Past Medical History:   Diagnosis Date    Asthma     Depression     Knee sprain     Right knee     No past surgical history on file. Family History   Problem Relation Age of Onset    Obesity Mother     Obesity Father     No Known Problems Brother     Obesity Maternal Grandmother     Arthritis-rheumatoid Maternal Grandmother      Social History     Socioeconomic History    Marital status: SINGLE     Spouse name: Not on file    Number of children: Not on file    Years of education: Not on file    Highest education level: Not on file   Tobacco Use    Smoking status: Current Every Day Smoker     Packs/day: 1.00    Smokeless tobacco: Never Used    Tobacco comment: Vaping    Substance and Sexual Activity    Alcohol use:  Yes     Alcohol/week: 3.0 standard drinks     Types: 3 Glasses of wine per week    Drug use: Yes     Types: Marijuana     Comment: on occasion    Sexual activity: Yes     Partners: Male     Birth control/protection: Condom   Social History Narrative    ** Merged History Encounter **            ROS:  Gen: No fever/chills  HEENT: No sore throat, eye pain, ear pain, or congestion. No HA  CV: No CP  Resp: No cough/SOB  GI: No abdominal pain  : No hematuria/dysuria  Derm: No rash  Neuro: No new paresthesias/weakness  Musc: No new myalgias/jt pain  Psych: No depression sx      Objective:     General: alert, cooperative, no distress   Mental  status: mental status: alert, oriented to person, place, and time, normal mood, behavior, speech, dress, motor activity, and thought processes   Resp: resp: normal effort and no respiratory distress   Neuro: neuro: no gross deficits   Skin: skin: no discoloration or lesions of concern on visible areas     LABS:  Lab Results   Component Value Date/Time    Sodium 139 02/20/2020 12:00 AM    Potassium 4.2 02/20/2020 12:00 AM    Chloride 102 02/20/2020 12:00 AM    CO2 24 02/20/2020 12:00 AM    Glucose 82 02/20/2020 12:00 AM    BUN 8 02/20/2020 12:00 AM    Creatinine 0.57 02/20/2020 12:00 AM    BUN/Creatinine ratio 14 02/20/2020 12:00 AM    GFR est  02/20/2020 12:00 AM    GFR est non- 02/20/2020 12:00 AM    Calcium 9.0 02/20/2020 12:00 AM       No results found for: CHOL, CHOLPOCT, CHOLX, CHLST, CHOLV, HDL, HDLPOC, HDLP, LDL, LDLCPOC, LDLC, DLDLP, VLDLC, VLDL, TGLX, TRIGL, TRIGP, TGLPOCT, CHHD, CHHDX    Lab Results   Component Value Date/Time    WBC 7.4 02/20/2020 12:00 AM    HGB 13.4 02/20/2020 12:00 AM    HCT 40.0 02/20/2020 12:00 AM    PLATELET 839 87/71/4777 12:00 AM    MCV 88 02/20/2020 12:00 AM       No results found for: HBA1C, HGBE8, AIP8XLQH, FBO8IMRB, TOA6ESBQ    Lab Results   Component Value Date/Time    TSH 1.720 02/08/2019 11:44 AM           Due to this being a TeleHealth  evaluation, many elements of the physical examination are unable to be assessed.      The pt was seen by synchronous (real-time) audio-video technology, and/or her healthcare decision maker, is aware that this patient-initiated, Telehealth encounter is a billable service, with coverage as determined by her insurance carrier. She is aware that she may receive a bill and has provided verbal consent to proceed: Yes. The pt is being evaluated by a video visit encounter for concerns as above. A caregiver was present when appropriate. Due to this being a TeleHealth encounter (During SGOID-76 public health emergency), evaluation of the following organ systems was limited: Vitals/Constitutional/EENT/Resp/CV/GI//MS/Neuro/Skin/Heme-Lymph-Imm. Pursuant to the emergency declaration under the [facility], [facility] waiver authority and the [employer] and [employer] Act, this Virtual  Visit was conducted, with patient's (and/or legal guardian's) consent, to reduce the patient's risk of exposure to COVID-19 and provide necessary medical care. Services were provided through a video synchronous discussion virtually to substitute for in-person clinic visit. Patient and provider were located at their individual homes. We discussed the expected course, resolution and complications of the diagnosis(es) in detail. Medication risks, benefits, costs, interactions, and alternatives were discussed as indicated. I advised her to contact the office if her condition worsens, changes or fails to improve as anticipated. She expressed understanding with the diagnosis(es) and plan.      Charley Rizo MD

## 2021-12-01 ENCOUNTER — TELEPHONE (OUTPATIENT)
Dept: INTERNAL MEDICINE CLINIC | Age: 26
End: 2021-12-01

## 2021-12-01 NOTE — TELEPHONE ENCOUNTER
Patient stating she has been in and out of urgent care and was seen at [facility] yesterday. Stating she was diagnosed with an inflammatory disease. Was advised to follow up with her PCP but no openings until the end of December. She is running a fever but has tested negative for COVID. Please advise.

## 2021-12-01 NOTE — TELEPHONE ENCOUNTER
Tried reaching patient to offer a virtual appointment tomorrow at [phone]. Number is not working.

## 2021-12-02 ENCOUNTER — TELEPHONE (OUTPATIENT)
Dept: INTERNAL MEDICINE CLINIC | Age: 26
End: 2021-12-02

## 2021-12-02 NOTE — TELEPHONE ENCOUNTER
----- Message from Linda Abbott sent at 12/2/2021  1:48 PM EST -----  Subject: Appointment Request    Reason for Call: Urgent (Patient Request) ED Follow Up Visit    QUESTIONS  Type of Appointment? Established Patient  Reason for appointment request? No appointments available during search  Additional Information for Provider? Needs an Urgent appointment. Been to   the ER and Urgent care a few times since Thanksgiving. Was diagnosed with   pelvic inflammation and was place on antibiotics (Keflex and Doxycycline). Fever broke today. Still having other symptoms. Please advise. Cell   service is spotty. Please leave details on VM.  ---------------------------------------------------------------------------  --------------  CALL BACK INFO  What is the best way for the office to contact you? OK to leave message on   voicemail  Preferred Call Back Phone Number? [phone]  ---------------------------------------------------------------------------  --------------  SCRIPT ANSWERS  Relationship to Patient? Self  (Patient requests to see provider urgently. )? Yes  Do you have any questions for your primary care provider that need to be   answered prior to your appointment? No  Have you been diagnosed with, awaiting test results for, or told that you   are suspected of having COVID-19 (Coronavirus)? (If patient has tested   negative or was tested as a requirement for work, school, or travel and   not based on symptoms, answer no)? No  Within the past two weeks have you developed any of the following symptoms   (answer no if symptoms have been present longer than 2 weeks or began   more than 2 weeks ago)? Fever or Chills, Cough, Shortness of breath or   difficulty breathing, Loss of taste or smell, Sore throat, Nasal   congestion, Sneezing or runny nose, Fatigue or generalized body aches   (answer no if pain is specific to a body part e.g. back pain), Diarrhea,   Headache?  Yes

## 2021-12-03 ENCOUNTER — VIRTUAL VISIT (OUTPATIENT)
Dept: INTERNAL MEDICINE CLINIC | Age: 26
End: 2021-12-03
Payer: MEDICAID

## 2021-12-03 DIAGNOSIS — R30.0 DYSURIA: Primary | ICD-10-CM

## 2021-12-03 DIAGNOSIS — N72 CERVICITIS: ICD-10-CM

## 2021-12-03 DIAGNOSIS — R30.0 DYSURIA: ICD-10-CM

## 2021-12-03 PROCEDURE — 99442 PR PHYS/QHP TELEPHONE EVALUATION 11-20 MIN: CPT | Performed by: INTERNAL MEDICINE

## 2021-12-03 NOTE — PROGRESS NOTES
Fanta Preston is a 27 y.o. female who was seen by synchronous (real-time) audio-phone only technology on 12/3/2021. Assessment & Plan:   Dysuria and Cervicitis: Per ED records. She tested negative for STDs. -I encouraged her to continue the antibiotic course that was prescribed by her ED team.  -Ordering a follow-up urinalysis and urine culture, for her to get if she does not respond to the antibiotics prescribed by the ED team.  -I encouraged her to follow-up with her gynecologist for a pelvic exam given ED PE findings. ORDERS:  - URINALYSIS W/ RFLX MICROSCOPIC; Future  - CULTURE, URINE; Future        Lab review: labs are reviewed in the EHR     I have discussed the diagnosis with the patient and the intended plan as seen in the above orders. I have discussed medication side effects and warnings with the patient as well. I have reviewed the plan of care with the patient, accepted their input and they are in agreement with the treatment goals. All questions were answered. The patient understands the plan of care. Pt instructed if symptoms worsen to call the office or report to the ED for continued care. Greater than 50% of the visit time was spent in counseling and/or coordination of care. I spent 14 min with her on the phone for this phone only encounter. Voice recognition was used to generate this report, which may have resulted in some phonetic based errors in grammar and contents. Even though attempts were made to correct all the mistakes, some may have been missed, and remained in the body of the document. Subjective:   Fanta Preston was seen for   Chief Complaint   Patient presents with    ED Follow-up        The patient is a 61-year-old female with history of moderate persistent asthma, nicotine dependence, ASCUS, allergic rhinitis (with h/o allergy testing), and anxiety/depression.     Abdominal Pain:  The patient was seen in the emergency department on November 30 after having dysuria and lightheadedness. At that time, she had an unremarkable CBC. Her BMP showed a sodium 132 but no other abnormalities. Her urinalysis was positive for RBCs and protein. She tested negative for trichomonas, gonorrhea, and chlamydia. \"It still stings\" with urination. She has less fatigue. The first abx was given to her from [facility] on Sunday. She was placed on levofloxacin. The ED staff put her on doxycycline and keflex. +Zofran. No emesis. She has a fever if she does not take her tylenol or motrin around the clock. She is scheduled with her GYN team later month. 11/30/21 Abdomen/Pelvis CT: No acute pathology within the abdomen or pelvis. Unremarkable appendix. No urinary tract stones or renal obstructive changes. Small to moderate nonspecific free pelvic fluid. Mild splenomegaly. From the PE of  (ED records): \"Genitourinary:  Cervix: Discharge, friability and lesion present. Uterus: Normal.   Adnexa:   Right: Tenderness present. No fullness. Left: Tenderness present. No fullness. \"        Prior to Admission medications    Medication Sig Start Date End Date Taking? Authorizing Provider   predniSONE (DELTASONE) 20 mg tablet Take 40 mg by mouth daily (with breakfast). 4/27/20   Tawana Ny MD   fluticasone propionate (FLONASE) 50 mcg/actuation nasal spray 2 Sprays by Both Nostrils route daily. 4/27/20   Tawana Ny MD   cetirizine (ZYRTEC) 10 mg tablet Take 1 Tab by mouth daily. 4/27/20   Tawana Ny MD   albuterol (PROVENTIL HFA, VENTOLIN HFA, PROAIR HFA) 90 mcg/actuation inhaler Take 2 Puffs by inhalation every four (4) hours as needed for Wheezing. 2/26/20   Tawana Ny MD   fluticasone propionate (FLOVENT HFA) 110 mcg/actuation inhaler Take 2 Puffs by inhalation every twelve (12) hours. 2/26/20   Tawana Ny MD   melatonin 3 mg tablet Take 3 mg by mouth daily as needed. Provider, Mary   sertraline (ZOLOFT) 50 mg tablet Take 50 mg by mouth daily. Provider, Historical     Allergies   Allergen Reactions    Latex Hives    Nickel Rash     Past Medical History:   Diagnosis Date    Asthma     Depression     Knee sprain     Right knee     No past surgical history on file. Family History   Problem Relation Age of Onset    Obesity Mother     Obesity Father     No Known Problems Brother     Obesity Maternal Grandmother     Arthritis-rheumatoid Maternal Grandmother      Social History     Socioeconomic History    Marital status: SINGLE   Tobacco Use    Smoking status: Current Every Day Smoker     Packs/day: 1.00    Smokeless tobacco: Never Used    Tobacco comment: Vaping    Substance and Sexual Activity    Alcohol use: Yes     Alcohol/week: 3.0 standard drinks     Types: 3 Glasses of wine per week    Drug use: Yes     Types: Marijuana     Comment: on occasion    Sexual activity: Yes     Partners: Male     Birth control/protection: Condom   Social History Narrative    ** Merged History Encounter **            ROS:  Gen: No fever/chills  HEENT: No sore throat, eye pain, ear pain, or congestion. No HA  CV: No CP  Resp: No cough/SOB  GI: See HPI. : See HPI. Derm: No rash  Neuro: No new paresthesias/weakness  Musc: No new myalgias/jt pain  Psych: No depression sx were discussed today.       LABS:  Lab Results   Component Value Date/Time    Sodium 139 02/20/2020 12:00 AM    Potassium 4.2 02/20/2020 12:00 AM    Chloride 102 02/20/2020 12:00 AM    CO2 24 02/20/2020 12:00 AM    Glucose 82 02/20/2020 12:00 AM    BUN 8 02/20/2020 12:00 AM    Creatinine 0.57 02/20/2020 12:00 AM    BUN/Creatinine ratio 14 02/20/2020 12:00 AM    GFR est  02/20/2020 12:00 AM    GFR est non- 02/20/2020 12:00 AM    Calcium 9.0 02/20/2020 12:00 AM       No results found for: CHOL, CHOLPOCT, CHOLX, CHLST, CHOLV, HDL, HDLPOC, HDLP, LDL, LDLCPOC, LDLC, DLDLP, VLDLC, VLDL, TGLX, TRIGL, TRIGP, TGLPOCT, CHHD, CHHDX    Lab Results   Component Value Date/Time    WBC 7.4 02/20/2020 12:00 AM    HGB 13.4 02/20/2020 12:00 AM    HCT 40.0 02/20/2020 12:00 AM    PLATELET 142 76/22/8991 12:00 AM    MCV 88 02/20/2020 12:00 AM       No results found for: HBA1C, [id number], JJY7DBTR    Lab Results   Component Value Date/Time    TSH 1.720 02/08/2019 11:44 AM           Due to this being a TeleHealth phone only evaluation, many elements of the physical examination are unable to be assessed. The pt was seen by synchronous (real-time) audio-phone only technology, and/or her healthcare decision maker, is aware that this patient-initiated, Telehealth encounter is a billable service, with coverage as determined by her insurance carrier. She is aware that she may receive a bill and has provided verbal consent to proceed: Yes. The pt is being evaluated by a phone only visit encounter for concerns as above. A caregiver was present when appropriate. Due to this being a TeleHealth encounter (During [facility]- public [facility] emergency), evaluation of the following organ systems was limited: Vitals/Constitutional/EENT/Resp/CV/GI//MS/Neuro/Skin/Heme-Lymph-Imm. Pursuant to the emergency declaration under the [address] [place] [address], [address] [place] [address] authority and the [employer] and [employer] Act, this Virtual phone only Visit was conducted, with patient's (and/or legal guardian's) consent, to reduce the patient's risk of exposure to COVID-19 and provide necessary medical care. Services were provided through a phone only synchronous discussion virtually to substitute for in-person clinic visit. Patient and provider were located at their individual home/office respectively. We discussed the expected course, resolution and complications of the diagnosis(es) in detail. Medication risks, benefits, costs, interactions, and alternatives were discussed as indicated.   I advised her to contact the office if her condition worsens, changes or fails to improve as anticipated. She expressed understanding with the diagnosis(es) and plan.      Jordon Post MD

## 2022-01-03 NOTE — PROGRESS NOTES
Please let the patient know that her thyroid ultrasound shows enlarged lymph nodes. I am referring her to Hematology for evaluation.     Dr. Leonela Ngo  [facility] [address]  Phone: [phone]  Fax: [phone] That's a question for the person who is managing his anticoagulation  They may not want him to come off for a period of time or they may want to bridge him with Lovenox  Usually 5 days is recommended for an elective surgery

## 2022-03-18 PROBLEM — Z72.0 TOBACCO ABUSE: Status: ACTIVE | Noted: 2019-02-08

## 2022-03-19 PROBLEM — R61 HYPERHIDROSIS: Status: ACTIVE | Noted: 2019-02-08

## 2022-03-19 PROBLEM — J45.40 MODERATE PERSISTENT ASTHMA WITHOUT COMPLICATION: Status: ACTIVE | Noted: 2019-10-21

## 2022-03-19 PROBLEM — M25.561 CHRONIC PAIN OF RIGHT KNEE: Status: ACTIVE | Noted: 2019-02-08

## 2022-03-19 PROBLEM — G89.29 CHRONIC PAIN OF RIGHT KNEE: Status: ACTIVE | Noted: 2019-02-08

## 2022-03-19 PROBLEM — R87.610 ATYPICAL SQUAMOUS CELLS OF UNDETERMINED SIGNIFICANCE (ASCUS) ON PAPANICOLAOU SMEAR OF CERVIX: Status: ACTIVE | Noted: 2019-10-21

## 2022-03-20 PROBLEM — R59.9 ENLARGED LYMPH NODE: Status: ACTIVE | Noted: 2020-01-28

## 2022-04-28 ENCOUNTER — OFFICE VISIT (OUTPATIENT)
Dept: INTERNAL MEDICINE CLINIC | Age: 27
End: 2022-04-28
Payer: MEDICAID

## 2022-04-28 VITALS
RESPIRATION RATE: 18 BRPM | WEIGHT: 193 LBS | SYSTOLIC BLOOD PRESSURE: 110 MMHG | TEMPERATURE: 97.3 F | BODY MASS INDEX: 31.02 KG/M2 | DIASTOLIC BLOOD PRESSURE: 64 MMHG | HEIGHT: 66 IN | OXYGEN SATURATION: 100 % | HEART RATE: 81 BPM

## 2022-04-28 DIAGNOSIS — M54.50 ACUTE LOW BACK PAIN, UNSPECIFIED BACK PAIN LATERALITY, UNSPECIFIED WHETHER SCIATICA PRESENT: ICD-10-CM

## 2022-04-28 DIAGNOSIS — F32.A DEPRESSION, UNSPECIFIED DEPRESSION TYPE: ICD-10-CM

## 2022-04-28 DIAGNOSIS — J45.40 MODERATE PERSISTENT ASTHMA WITHOUT COMPLICATION: Primary | ICD-10-CM

## 2022-04-28 DIAGNOSIS — Z86.16 HISTORY OF COVID-19: ICD-10-CM

## 2022-04-28 DIAGNOSIS — E66.09 CLASS 1 OBESITY DUE TO EXCESS CALORIES WITH BODY MASS INDEX (BMI) OF 31.0 TO 31.9 IN ADULT, UNSPECIFIED WHETHER SERIOUS COMORBIDITY PRESENT: ICD-10-CM

## 2022-04-28 DIAGNOSIS — J30.9 ALLERGIC RHINITIS, UNSPECIFIED SEASONALITY, UNSPECIFIED TRIGGER: ICD-10-CM

## 2022-04-28 PROCEDURE — 99214 OFFICE O/P EST MOD 30 MIN: CPT | Performed by: INTERNAL MEDICINE

## 2022-04-28 RX ORDER — ALBUTEROL SULFATE 90 UG/1
2 AEROSOL, METERED RESPIRATORY (INHALATION)
Qty: 1 EACH | Refills: 5 | Status: SHIPPED | OUTPATIENT
Start: 2022-04-28 | End: 2022-09-21 | Stop reason: SDUPTHER

## 2022-04-28 RX ORDER — PAROXETINE HYDROCHLORIDE HEMIHYDRATE 25 MG/1
25 TABLET, FILM COATED, EXTENDED RELEASE ORAL DAILY
COMMUNITY

## 2022-04-28 RX ORDER — PSEUDOEPHEDRINE HCL 30 MG
10 TABLET ORAL DAILY
COMMUNITY

## 2022-04-28 NOTE — PROGRESS NOTES
Naima Quinonez presents today for   Chief Complaint   Patient presents with    Physical       1. \"Have you been to the ER, urgent care clinic since your last visit? Hospitalized since your last visit? \" yes [facility]  12/2022   PID    2. \"Have you seen or consulted any other health care providers outside of the [address] [place] [address] since your last visit? \" Jessica Laughlin     3. For patients aged 39-70: Has the patient had a colonoscopy / FIT/ Cologuard? NA - based on age      If the patient is female:    4. For patients aged 41-77: Has the patient had a mammogram within the past 2 years? NA - based on age or sex  See top three    5. For patients aged 21-65: Has the patient had a pap smear?  NA - based on age or sex

## 2022-04-28 NOTE — PATIENT INSTRUCTIONS
Body Mass Index: Care Instructions  Your Care Instructions     Body mass index (BMI) can help you see if your weight is raising your risk for health problems. It uses a formula to compare how much you weigh with how tall you are. · A BMI lower than 18.5 is considered underweight. · A BMI between 18.5 and 24.9 is considered healthy. · A BMI between 25 and 29.9 is considered overweight. A BMI of 30 or higher is considered obese. If your BMI is in the normal range, it means that you have a lower risk for weight-related health problems. If your BMI is in the overweight or obese range, you may be at increased risk for weight-related health problems, such as high blood pressure, heart disease, stroke, arthritis or joint pain, and diabetes. If your BMI is in the underweight range, you may be at increased risk for health problems such as fatigue, lower protection (immunity) against illness, muscle loss, bone loss, hair loss, and hormone problems. BMI is just one measure of your risk for weight-related health problems. You may be at higher risk for health problems if you are not active, you eat an unhealthy diet, or you drink too much alcohol or use tobacco products. Follow-up care is a key part of your treatment and safety. Be sure to make and go to all appointments, and call your doctor if you are having problems. It's also a good idea to know your test results and keep a list of the medicines you take. How can you care for yourself at home? · Practice healthy eating habits. This includes eating plenty of fruits, vegetables, whole grains, lean protein, and low-fat dairy. · If your doctor recommends it, get more exercise. Walking is a good choice. Bit by bit, increase the amount you walk every day. Try for at least 30 minutes on most days of the week. · Do not smoke. Smoking can increase your risk for health problems. If you need help quitting, talk to your doctor about stop-smoking programs and medicines. These can increase your chances of quitting for good. · Limit alcohol to 2 drinks a day for men and 1 drink a day for women. Too much alcohol can cause health problems. If you have a BMI higher than 25  · Your doctor may do other tests to check your risk for weight-related health problems. This may include measuring the distance around your waist. A waist measurement of more than 40 inches in men or 35 inches in women can increase the risk of weight-related health problems. · Talk with your doctor about steps you can take to stay healthy or improve your health. You may need to make lifestyle changes to lose weight and stay healthy, such as changing your diet and getting regular exercise. If you have a BMI lower than 18.5  · Your doctor may do other tests to check your risk for health problems. · Talk with your doctor about steps you can take to stay healthy or improve your health. You may need to make lifestyle changes to gain or maintain weight and stay healthy, such as getting more healthy foods in your diet and doing exercises to build muscle. Where can you learn more? Go to http://www.chowdhury.com/  Enter S176 in the search box to learn more about \"Body Mass Index: Care Instructions. \"  Current as of: December 27, 2021               Content Version: 13.2  © 2006-2022 Healthwise, [employer]. Care instructions adapted under license by [employer] (which disclaims liability or warranty for this information). If you have questions about a medical condition or this instruction, always ask your healthcare professional. [place] [address] any warranty or liability for your use of this information. Eating Healthy Foods: Care Instructions  Your Care Instructions     Eating healthy foods can help lower your risk for disease. Healthy food gives you energy and keeps your heart strong, your brain active, your muscles working, and your bones strong.   A healthy diet includes a variety of foods from the basic food groups: grains, vegetables, fruits, milk and milk products, and meat and beans. Some people may eat more of their favorite foods from only one food group and, as a result, miss getting the nutrients they need. So, it is important to pay attention not only to what you eat but also to what you are missing from your diet. You can eat a healthy, balanced diet by making a few small changes. Follow-up care is a key part of your treatment and safety. Be sure to make and go to all appointments, and call your doctor if you are having problems. It's also a good idea to know your test results and keep a list of the medicines you take. How can you care for yourself at home? Look at what you eat  · Keep a food diary for a week or two and record everything you eat or drink. Track the number of servings you eat from each food group. · For a balanced diet every day, eat a variety of:  ? 6 or more ounce-equivalents of grains, such as cereals, breads, crackers, rice, or pasta, every day. An ounce-equivalent is 1 slice of bread, 1 cup of ready-to-eat cereal, or ½ cup of cooked rice, cooked pasta, or cooked cereal.  ? 2½ cups of vegetables, especially:  § Dark-green vegetables such as broccoli and spinach. § Orange vegetables such as carrots and sweet potatoes. § Dry beans (such as chao and kidney beans) and peas (such as lentils). ? 2 cups of fresh, frozen, or canned fruit. A small apple or 1 banana or orange equals 1 cup. ? 3 cups of nonfat or low-fat milk, yogurt, or other milk products. ? 5½ ounces of meat and beans, such as chicken, fish, lean meat, beans, nuts, and seeds. One egg, 1 tablespoon of peanut butter, ½ ounce nuts or seeds, or ¼ cup of cooked beans equals 1 ounce of meat. · Learn how to read food labels for serving sizes and ingredients. Fast-food and convenience-food meals often contain few or no fruits or vegetables.  Make sure you eat some fruits and vegetables to make the meal more nutritious. · Look at your food diary. For each food group, add up what you have eaten and then divide the total by the number of days. This will give you an idea of how much you are eating from each food group. See if you can find some ways to change your diet to make it more healthy. Start small  · Do not try to make dramatic changes to your diet all at once. You might feel that you are missing out on your favorite foods and then be more likely to fail. · Start slowly, and gradually change your habits. Try some of the following:  ? Use whole wheat bread instead of white bread. ? Use nonfat or low-fat milk instead of whole milk. ? Eat brown rice instead of white rice, and eat whole wheat pasta instead of white-flour pasta. ? Try low-fat cheeses and low-fat yogurt. ? Add more fruits and vegetables to meals and have them for snacks. ? Add lettuce, tomato, cucumber, and onion to sandwiches. ? Add fruit to yogurt and cereal.  Enjoy food  · You can still eat your favorite foods. You just may need to eat less of them. If your favorite foods are high in fat, salt, and sugar, limit how often you eat them, but do not cut them out entirely. · Eat a wide variety of foods. Make healthy choices when eating out  · The type of restaurant you choose can help you make healthy choices. Even fast-food chains are now offering more low-fat or healthier choices on the menu. · Choose smaller portions, or take half of your meal home. · When eating out, try:  ? A veggie pizza with a whole wheat crust or grilled chicken (instead of sausage or pepperoni). ? Pasta with roasted vegetables, grilled chicken, or marinara sauce instead of cream sauce. ? A vegetable wrap or grilled chicken wrap. ? Broiled or poached food instead of fried or breaded items.   Make healthy choices easy  · Buy packaged, prewashed, ready-to-eat fresh vegetables and fruits, such as baby carrots, salad mixes, and chopped or shredded broccoli and cauliflower. · Buy packaged, presliced fruits, such as melon or pineapple. · Choose 100% fruit or vegetable juice instead of soda. Limit juice intake to 4 to 6 oz (½ to ¾ cup) a day. · Blend low-fat yogurt, fruit juice, and canned or frozen fruit to make a smoothie for breakfast or a snack. Where can you learn more? Go to http://www.chowdhury.com/  Enter T756 in the search box to learn more about \"Eating Healthy Foods: Care Instructions. \"  Current as of: September 8, 2021               Content Version: 13.2  © 2025-8683 Healthwise, [employer]. Care instructions adapted under license by [employer] (which disclaims liability or warranty for this information). If you have questions about a medical condition or this instruction, always ask your healthcare professional. [place] [address] any warranty or liability for your use of this information.

## 2022-04-28 NOTE — PROGRESS NOTES
[facility]:  2022, MRN: [id number]      Mala Lala is a 24 y.o. female and presents to clinic for Physical      Subjective: The patient is a 72-year-old female with history of moderate persistent asthma, Covid-19 infection (), nicotine dependence, ASCUS, allergic rhinitis (with h/o allergy testing), and anxiety/depression. 1. Covid-19: Occurred in January. Fully recovered. 2. Lower Back Pain: Right sided. Pain is positional. She has seen a chiropractor Dinesh Winston) in the past who relieved this same type of pain. 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 Allergic rhinntis: She is playing \"phone tag\" with her allergist with getting in for allergy shots. Allergy shots worked well for her in the past. Taking zyrtec and flonase. 4. Asthma: \"I felt a little wheezy. \" Her inhalers are all . 5. Depression and Anxiety: Depression sx responded well to zoloft in the past. This time, she has had more social anxiety - \"it's hard for me to go to the grocery store. \" She feels like people are watching her when she is in the stores. The zoloft made her depressed when she restarted it. She feels much better with taking paxil, which she's been on x 2 wks. She is drinking less. +Smoking marijuana. She does not have a carrying card. 6. General:  - +H/o ASCUS. Her last PAP was in December      Patient Active Problem List    Diagnosis Date Noted    Depression     Enlarged lymph node 2020    Moderate persistent asthma without complication     Atypical squamous cells of undetermined significance (ASCUS) on Papanicolaou smear of cervix 10/21/2019    Chronic pain of right knee 2019    Tobacco abuse 2019    Hyperhidrosis 2019       Current Outpatient Medications   Medication Sig Dispense Refill    PARoxetine CR (PAXIL-CR) 25 mg tablet Take 25 mg by mouth daily.       pseudoephedrine (Sudafed) 30 mg tablet Take 10 mg by mouth daily.  multivitamin with iron (FLINTSTONES) chewable tablet Take 1 Tablet by mouth daily.  fluticasone propionate (FLONASE) 50 mcg/actuation nasal spray 2 Sprays by Both Nostrils route daily. 1 Bottle 11    cetirizine (ZYRTEC) 10 mg tablet Take 1 Tab by mouth daily. 90 Tab 3    albuterol (PROVENTIL HFA, VENTOLIN HFA, PROAIR HFA) 90 mcg/actuation inhaler Take 2 Puffs by inhalation every four (4) hours as needed for Wheezing. 1 Inhaler 5    fluticasone propionate (FLOVENT HFA) 110 mcg/actuation inhaler Take 2 Puffs by inhalation every twelve (12) hours. 1 Inhaler 11    melatonin 3 mg tablet Take 3 mg by mouth daily as needed. (Patient not taking: Reported on 4/28/2022)      sertraline (ZOLOFT) 50 mg tablet Take 50 mg by mouth daily. (Patient not taking: Reported on 4/28/2022)         Allergies   Allergen Reactions    Latex Hives    Nickel Rash       Past Medical History:   Diagnosis Date    Asthma     Depression     Knee sprain     Right knee       No past surgical history on file. Family History   Problem Relation Age of Onset    Obesity Mother     Obesity Father     No Known Problems Brother     Obesity Maternal Grandmother     Arthritis-rheumatoid Maternal Grandmother        Social History     Tobacco Use    Smoking status: Current Every Day Smoker     Packs/day: 1.00    Smokeless tobacco: Never Used    Tobacco comment: Vaping    Substance Use Topics    Alcohol use: Yes     Alcohol/week: 3.0 standard drinks     Types: 3 Glasses of wine per week       ROS   Review of Systems   Constitutional: Negative for chills and fever. HENT: Negative for ear pain and sore throat. Eyes: Negative for blurred vision and pain. Respiratory: Negative for cough and shortness of breath. Cardiovascular: Negative for chest pain. Gastrointestinal: Negative for abdominal pain, blood in stool and melena. Genitourinary: Negative for dysuria and hematuria. Musculoskeletal: Negative for joint pain and myalgias. Skin: Negative for rash. Neurological: Negative for headaches. Endo/Heme/Allergies: Does not bruise/bleed easily. Psychiatric/Behavioral: Negative for substance abuse. Objective     Vitals:    04/28/22 0931   BP: 110/64   Pulse: 81   Resp: 18   Temp: 97.3 °F (36.3 °C)   TempSrc: Temporal   SpO2: 100%   Weight: 193 lb (87.5 kg)   Height: 5' 6\" (1.676 m)   PainSc:   7   PainLoc: Back       Physical Exam  Vitals and nursing note reviewed. HENT:      Head: Normocephalic and atraumatic. Right Ear: External ear normal.      Left Ear: External ear normal.   Eyes:      General: No scleral icterus. Right eye: No discharge. Left eye: No discharge. Conjunctiva/sclera: Conjunctivae normal.   Cardiovascular:      Rate and Rhythm: Normal rate and regular rhythm. Heart sounds: Normal heart sounds. No murmur heard. No friction rub. No gallop. Pulmonary:      Effort: Pulmonary effort is normal. No respiratory distress. Breath sounds: Normal breath sounds. No wheezing or rales. Abdominal:      General: Bowel sounds are normal. There is no distension. Palpations: Abdomen is soft. There is no mass. Tenderness: There is no abdominal tenderness. There is no guarding or rebound. Musculoskeletal:         General: No swelling (BUE) or tenderness (BUE). Cervical back: Neck supple. Comments: All spinous processes are nontender to palpation. Lymphadenopathy:      Cervical: No cervical adenopathy. Skin:     General: Skin is warm and dry. Findings: No erythema or rash. Neurological:      Mental Status: She is alert. Motor: No abnormal muscle tone.       Gait: Gait normal.   Psychiatric:         Mood and Affect: Mood normal.         LABS   Data Review:   Lab Results   Component Value Date/Time    WBC 7.4 02/20/2020 12:00 AM    HGB 13.4 02/20/2020 12:00 AM    HCT 40.0 02/20/2020 12:00 AM    PLATELET 293 02/20/2020 12:00 AM    MCV 88 02/20/2020 12:00 AM       Lab Results   Component Value Date/Time    Sodium 139 02/20/2020 12:00 AM    Potassium 4.2 02/20/2020 12:00 AM    Chloride 102 02/20/2020 12:00 AM    CO2 24 02/20/2020 12:00 AM    Glucose 82 02/20/2020 12:00 AM    BUN 8 02/20/2020 12:00 AM    Creatinine 0.57 02/20/2020 12:00 AM    BUN/Creatinine ratio 14 02/20/2020 12:00 AM    GFR est  02/20/2020 12:00 AM    GFR est non- 02/20/2020 12:00 AM    Calcium 9.0 02/20/2020 12:00 AM       No results found for: CHOL, CHOLPOCT, CHOLX, CHLST, CHOLV, HDL, HDLPOC, HDLP, LDL, LDLCPOC, LDLC, DLDLP, VLDLC, VLDL, TGLX, TRIGL, TRIGP, TGLPOCT, CHHD, CHHDX    No results found for: HBA1C, [id number], [id number]    Assessment/Plan:   1. Moderate persistent asthma without complication: +Allergic rhinitis. -Refilling her inhalers  -Okay to take medication over-the-counter as needed for seasonal allergies. - If she is using this frequently, I instructed her to notify me at which time I would need to adjust her Flovent. ORDERS:  - albuterol (PROVENTIL HFA, VENTOLIN HFA, PROAIR HFA) 90 mcg/actuation inhaler; Take 2 Puffs by inhalation every four (4) hours as needed for Wheezing. Dispense: 1 Each; Refill: 5    2. Class 1 obesity: Her weight is 193 pounds.  -We discussed pharmacotherapy options. For now, we will proceed with a referral to a nutritionist for assistance with weight reduction. ORDERS:  - REFERRAL TO NUTRITION    3. H/o Covid: Fully recovered. Observation. I encouraged her to stay up-to-date with her vaccinations. 4. Depression/Anxiety: Improved. -Continue with Paxil as prescribed. 5.  Health Maintenance:  - Requesting her last Pap. 6.  Lower back pain: Likely from strain.  - Okay to continue with sessions/therapy per her chiropractor. - Activity as tolerated.         Health Maintenance Due   Topic Date Due    Hepatitis C Screening  Never done    COVID-19 Vaccine (1) Never done   [address] [place] [address] Pneumococcal 0-64 years (1 - PCV) Never done    Depression Monitoring  Never done    DTaP/Tdap/Td series (7 - Td or Tdap) 03/06/2018         Lab review: labs are reviewed in the EHR    I have discussed the diagnosis with the patient and the intended plan as seen in the above orders. The patient has received an after-visit summary and questions were answered concerning future plans. I have discussed medication side effects and warnings with the patient as well. I have reviewed the plan of care with the patient, accepted their input and they are in agreement with the treatment goals. All questions were answered. The patient understands the plan of care. Handouts provided today with above information. Pt instructed if symptoms worsen to call the office or report to the ED for continued care. Greater than 50% of the visit time was spent in counseling and/or coordination of care. Voice recognition was used to generate this report, which may have resulted in some phonetic based errors in grammar and contents. Even though attempts were made to correct all the mistakes, some may have been missed, and remained in the body of the document.           Chiquita Lopez MD

## 2022-05-05 RX ORDER — FLUTICASONE PROPIONATE 110 UG/1
2 AEROSOL, METERED RESPIRATORY (INHALATION) EVERY 12 HOURS
Qty: 1 EACH | Refills: 5 | Status: SHIPPED | OUTPATIENT
Start: 2022-05-05 | End: 2022-09-21 | Stop reason: SDUPTHER

## 2022-06-16 ENCOUNTER — TELEPHONE (OUTPATIENT)
Dept: INTERNAL MEDICINE CLINIC | Age: 27
End: 2022-06-16

## 2022-06-16 NOTE — TELEPHONE ENCOUNTER
Called patient to offer an appointment today per DR. Lizbeth Miramontes. Pt states appt not needed, as she just left urgent care.

## 2022-06-20 ENCOUNTER — HOSPITAL ENCOUNTER (OUTPATIENT)
Dept: NUTRITION | Age: 27
Discharge: HOME | End: 2022-06-20
Attending: INTERNAL MEDICINE
Payer: MEDICAID

## 2022-06-20 PROCEDURE — 97802 MEDICAL NUTRITION INDIV IN: CPT

## 2022-06-20 NOTE — PROGRESS NOTES
[address] [facility]    Nutrition Assessment - Medical Nutrition Therapy   Outpatient Initial Evaluation         Patient Name: Anmol Monteiro : 1995   Treatment Diagnosis: Class 1 obesity due to excess calories with body mass index (BMI) of 31.0 to 31.9 in adult, unspecified whether serious comorbidity present   Referral Source: Isabel Rapp MD [facility]): 2022     Gender: female Age: 24 y.o. Ht: 66 in Wt: 193  lb  kg   BMI: 31.2 BMR   Male  BMR Female 3608     Past Medical History:  Asthma, Depression, Arthritis, Latex allergy, Tobacco use     Pertinent Medications:   Includes: Trazodone, Zyrtec, Flonase, Flovent, Albuterol, Multivitamin, Paroxetine      Biochemical Data:        Assessment:   Patient present to learn about nutrition related to weight loss. Patient reports that she would like to learn a healthier way of eating that is sustainable and works with a limited budget. Patient does not have access to a full-kitchen but does have a toaster oven and microwave. Patient prepares all meals at home. Patient recently quit her job and is currently looking for new employment. Food & Nutrition: 24 Hour Recall:  Breakfast: chicken sandwich on potato bread with hough   Lunch: same as breakfast   Dinner: spaghetti with sauce   Drinks: water, whole milk, Mountain Dew 1-4 cans per day   Snacks: candy, PB pretzels        Estimate Needs   Calories:  1800 Protein: 113 Carbs: 203 Fat: 60   Kcal/day  g/day  g/day  g/day        percent: 25  45  30               Nutrition Diagnosis Obesity related to excessive caloric intake as evidenced by 24 hour recall of calorie dense food and drink(Mountain Dew, whole milk, candy, potato bread, pasta). Nutrition Intervention &  Education: Educated patient on the need for a consistent carbohydrate intake related to weight loss.  Educated patient on nutrition label reading, emphasizing carbohydrates and serving size. Educated patient on protein sources, encouraged patient to incorporate mostly lean protein source with all carbohydrates. Encouraged patient to exercise 150 minutes per week. Handouts Provided: [x]  Carbohydrates  [x]  Protein  [x]  Non-starchy Vegatbles  [x]  Food Label  [x]  Meal and Snack Ideas  []  Food Journals []  Diabetes  []  Cholesterol  []  Sodium  [x]  Gen Nutr Guidelines  []  SBGM Guidelines  []  Others:   Information Reviewed with: Patient    Readiness to Change Stage: []  Pre-contemplative    [x]  Contemplative  []  Preparation               []  Action                  []  Maintenance   Potential Barriers to Learning: []  Decline in memory    []  Language barrier   []  Other:  []  Emotional                  []  Limited mobility  [x]  Lack of motivation     [] Vision, hearing or cognitive impairment   Expected Compliance: Fair      Nutritional Goal - To promote lifestyle changes to result in:    [x]  Weight loss  []  Improved diabetic control  []  Decreased cholesterol levels  []  Decreased blood pressure  []  Weight maintenance []  Preventing any interactions associated with food allergies  []  Adequate weight gain toward goal weight  []  Other:        Patient Goals:   1. Patient will consume three meals per day 4-5 hours apart. 2. Patient will include a protein at all meals and snacks. 3. Patient will drink 64 ounces of water daily.       Dietitian Signature: Coby Cosby RD Date: 6/20/2022   Follow-up:  Time: 3:11 PM

## 2022-07-08 ENCOUNTER — OFFICE VISIT (OUTPATIENT)
Dept: INTERNAL MEDICINE CLINIC | Age: 27
End: 2022-07-08
Payer: MEDICAID

## 2022-07-08 ENCOUNTER — TELEPHONE (OUTPATIENT)
Dept: INTERNAL MEDICINE CLINIC | Age: 27
End: 2022-07-08

## 2022-07-08 VITALS
DIASTOLIC BLOOD PRESSURE: 73 MMHG | WEIGHT: 189 LBS | OXYGEN SATURATION: 98 % | RESPIRATION RATE: 16 BRPM | HEART RATE: 84 BPM | BODY MASS INDEX: 30.37 KG/M2 | SYSTOLIC BLOOD PRESSURE: 113 MMHG | HEIGHT: 66 IN | TEMPERATURE: 97.9 F

## 2022-07-08 DIAGNOSIS — R11.0 NAUSEA: ICD-10-CM

## 2022-07-08 DIAGNOSIS — R13.10 DYSPHAGIA, UNSPECIFIED TYPE: ICD-10-CM

## 2022-07-08 DIAGNOSIS — J02.9 PHARYNGITIS, UNSPECIFIED ETIOLOGY: Primary | ICD-10-CM

## 2022-07-08 DIAGNOSIS — J02.9 PHARYNGITIS, UNSPECIFIED ETIOLOGY: ICD-10-CM

## 2022-07-08 PROCEDURE — 99214 OFFICE O/P EST MOD 30 MIN: CPT | Performed by: INTERNAL MEDICINE

## 2022-07-08 RX ORDER — FAMOTIDINE 20 MG/1
20 TABLET, FILM COATED ORAL 2 TIMES DAILY
Qty: 60 TABLET | Refills: 0 | Status: SHIPPED | OUTPATIENT
Start: 2022-07-08 | End: 2022-08-07

## 2022-07-08 RX ORDER — TRAZODONE HYDROCHLORIDE 50 MG/1
TABLET ORAL
COMMUNITY
Start: 2022-06-16

## 2022-07-08 RX ORDER — AMOXICILLIN 500 MG/1
500 CAPSULE ORAL 2 TIMES DAILY
Qty: 20 CAPSULE | Refills: 0 | Status: SHIPPED | OUTPATIENT
Start: 2022-07-08 | End: 2022-07-18

## 2022-07-08 RX ORDER — ONDANSETRON 4 MG/1
TABLET, ORALLY DISINTEGRATING ORAL
Qty: 30 TABLET | Refills: 5 | Status: SHIPPED | OUTPATIENT
Start: 2022-07-08

## 2022-07-08 RX ORDER — METHYLPREDNISOLONE 4 MG/1
TABLET ORAL
Qty: 1 DOSE PACK | Refills: 0 | Status: SHIPPED | OUTPATIENT
Start: 2022-07-08

## 2022-07-08 RX ORDER — ONDANSETRON 4 MG/1
TABLET, ORALLY DISINTEGRATING ORAL
COMMUNITY
Start: 2022-05-16 | End: 2022-07-08 | Stop reason: SDUPTHER

## 2022-07-08 NOTE — LETTER
NOTIFICATION RETURN TO WORK / SCHOOL    7/8/2022 8:52 AM    Ms. [facility] [place] [address]      To Whom It May Concern:    [facility] is currently under the care of Juwan Suero. She will return to work/school on: 7-9-22 if COVID test is negative    If there are questions or concerns please have the patient contact our office.         Sincerely,      Carlos Enrique Torres MD

## 2022-07-08 NOTE — PROGRESS NOTES
[facility]:  7/8/2022, MRN: [id number]      Angie Troy is a 24 y.o. female and presents to clinic for Sore Throat (Right side x 3 days. Denies any fever or chills) and Jaw Pain (right side)      Subjective: The patient is a 29-year-old female with history of moderate persistent asthma, Covid-19 infection (1/22), nicotine dependence, ASCUS, allergic rhinitis (with h/o allergy testing), and anxiety/depression. 1. Sore throat/jaw pain: Present along the right side of her jaw/throat. No fever or chills. Sick contacts: none. Sx have been present x 3 days. She is taking tylenol a little. No fever. No SOB or eye pain. +Right ear pain. \"I found bumps along the inside of the throat. \" +Congestion. +H/o seasonal allergies. She has not tested for Covid. +H/o Covid vaccinations x3. +Smoking. No wheezing.,     2. Nausea: She has intermittent nausea. She takes zofran prn. She has had nausea since November. Driving or being a passenger in a car causes sx to occur. Sx resolve with zofran. No abdominal pain. Her psychiatrist is ok with her taking this rx. No bleeding hx. She has had trouble swallowing in the past.       Patient Active Problem List    Diagnosis Date Noted    Depression     Enlarged lymph node 01/28/2020    Moderate persistent asthma without complication 62/79/5614    Atypical squamous cells of undetermined significance (ASCUS) on Papanicolaou smear of cervix 10/21/2019    Chronic pain of right knee 02/08/2019    Tobacco abuse 02/08/2019    Hyperhidrosis 02/08/2019       Current Outpatient Medications   Medication Sig Dispense Refill    traZODone (DESYREL) 50 mg tablet 1 TABLET BY MOUTH AS NEEDED FOR INSOMNIA ONCE NIGHTLY.  ondansetron (ZOFRAN ODT) 4 mg disintegrating tablet 1 TABLET BY MOUTH AS NEEDED FOR SEVERE NAUSEA UP TO TWICE DAILY.  fluticasone propionate (FLOVENT HFA) 110 mcg/actuation inhaler Take 2 Puffs by inhalation every twelve (12) hours.  1 Each 5    PARoxetine CR (PAXIL-CR) 25 mg tablet Take 25 mg by mouth daily.  pseudoephedrine (Sudafed) 30 mg tablet Take 10 mg by mouth daily.  multivitamin with iron (FLINTSTONES) chewable tablet Take 1 Tablet by mouth daily.  albuterol (PROVENTIL HFA, VENTOLIN HFA, PROAIR HFA) 90 mcg/actuation inhaler Take 2 Puffs by inhalation every four (4) hours as needed for Wheezing. 1 Each 5    fluticasone propionate (FLONASE) 50 mcg/actuation nasal spray 2 Sprays by Both Nostrils route daily. 1 Bottle 11    cetirizine (ZYRTEC) 10 mg tablet Take 1 Tab by mouth daily. 90 Tab 3       Allergies   Allergen Reactions    Latex Hives    Nickel Rash    Zoloft [Sertraline] Other (comments)       Past Medical History:   Diagnosis Date    Asthma     Depression     Knee sprain     Right knee       No past surgical history on file. Family History   Problem Relation Age of Onset    Obesity Mother     Obesity Father     No Known Problems Brother     Obesity Maternal Grandmother     Arthritis-rheumatoid Maternal Grandmother        Social History     Tobacco Use    Smoking status: Current Every Day Smoker     Packs/day: 1.00    Smokeless tobacco: Never Used    Tobacco comment: Vaping    Substance Use Topics    Alcohol use: Yes     Alcohol/week: 3.0 standard drinks     Types: 3 Glasses of wine per week       ROS   Review of Systems   Constitutional: Negative for chills and fever. HENT: Positive for sore throat. Negative for ear pain. Eyes: Negative for blurred vision and pain. Respiratory: Negative for cough and shortness of breath. Cardiovascular: Negative for chest pain. Gastrointestinal: Negative for abdominal pain, blood in stool and melena. Genitourinary: Negative for dysuria and hematuria. Musculoskeletal: Negative for joint pain and myalgias. Skin: Negative for rash. Neurological: Negative for headaches. Endo/Heme/Allergies: Does not bruise/bleed easily.    Psychiatric/Behavioral: Negative for substance abuse.       Objective     Vitals:    07/08/22 0812   BP: 113/73   Pulse: 84   Resp: 16   Temp: 97.9 °F (36.6 °C)   TempSrc: Temporal   SpO2: 98%   Weight: 189 lb (85.7 kg)   Height: 5' 6\" (1.676 m)   PainSc:   4   PainLoc: Throat   LMP: 06/28/2022       Physical Exam  Vitals and nursing note reviewed. HENT:      Head: Normocephalic and atraumatic. Right Ear: Tympanic membrane and external ear normal.      Left Ear: Tympanic membrane and external ear normal.      Mouth/Throat:      Comments: She has erythema along her posterior oropharynx. She has small bump b/l on the back of her oropharynx. She does not have tonsils. Eyes:      General: No scleral icterus. Right eye: No discharge. Left eye: No discharge. Conjunctiva/sclera: Conjunctivae normal.   Cardiovascular:      Rate and Rhythm: Normal rate and regular rhythm. Heart sounds: Normal heart sounds. No murmur heard. No friction rub. No gallop. Pulmonary:      Effort: Pulmonary effort is normal. No respiratory distress. Breath sounds: Normal breath sounds. No wheezing or rales. Abdominal:      General: Bowel sounds are normal. There is no distension. Palpations: Abdomen is soft. There is no mass. Tenderness: There is no abdominal tenderness. There is no guarding or rebound. Musculoskeletal:         General: No swelling (BUE) or tenderness (BUE). Cervical back: Neck supple. Lymphadenopathy:      Cervical: No cervical adenopathy. Skin:     General: Skin is warm and dry. Findings: No erythema or rash. Neurological:      Mental Status: She is alert. Motor: No abnormal muscle tone.       Gait: Gait normal.   Psychiatric:         Mood and Affect: Mood normal.         LABS   Data Review:   Lab Results   Component Value Date/Time    WBC 7.4 02/20/2020 12:00 AM    HGB 13.4 02/20/2020 12:00 AM    HCT 40.0 02/20/2020 12:00 AM    PLATELET 107 27/79/6783 12:00 AM    MCV 88 02/20/2020 12:00 AM       Lab Results   Component Value Date/Time    Sodium 139 02/20/2020 12:00 AM    Potassium 4.2 02/20/2020 12:00 AM    Chloride 102 02/20/2020 12:00 AM    CO2 24 02/20/2020 12:00 AM    Glucose 82 02/20/2020 12:00 AM    BUN 8 02/20/2020 12:00 AM    Creatinine 0.57 02/20/2020 12:00 AM    BUN/Creatinine ratio 14 02/20/2020 12:00 AM    GFR est  02/20/2020 12:00 AM    GFR est non- 02/20/2020 12:00 AM    Calcium 9.0 02/20/2020 12:00 AM       No results found for: CHOL, CHOLPOCT, CHOLX, CHLST, CHOLV, HDL, HDLPOC, HDLP, LDL, LDLCPOC, LDLC, DLDLP, VLDLC, VLDL, TGLX, TRIGL, TRIGP, TGLPOCT, CHHD, CHHDX    No results found for: HBA1C, TYC5HOND, CPL5DUXQ    Assessment/Plan:   1. Pharyngitis: Per PE findings.  -I encouraged her to do a rapid COVID-19 test.  - Ordering amoxicillin and Medrol Dosepak to cover for strep throat. ORDERS:  - methylPREDNISolone (MEDROL DOSEPACK) 4 mg tablet; Take per package instructions  Dispense: 1 Dose Pack; Refill: 0  - amoxicillin (AMOXIL) 500 mg capsule; Take 1 Capsule by mouth two (2) times a day for 10 days. Dispense: 20 Capsule; Refill: 0    2. Nausea: Etiology is unknown. +Dysphagia. -Refill on her Zofran. - Ordering Verrusol study. - I instructed her to notify me if her symptoms worsen. ORDERS:  - ondansetron (ZOFRAN ODT) 4 mg disintegrating tablet; 1 TABLET BY MOUTH AS NEEDED FOR SEVERE NAUSEA UP TO TWICE DAILY. Dispense: 30 Tablet; Refill: 5  - XR UGI/BA SWALLOW W SM BOWEL; Future      Health Maintenance Due   Topic Date Due    Hepatitis C Screening  Never done    COVID-19 Vaccine (1) Never done    Pneumococcal 0-64 years (1 - PCV) Never done    DTaP/Tdap/Td series (7 - Td or Tdap) 03/06/2018         Lab review: labs are reviewed in the EHR    I have discussed the diagnosis with the patient and the intended plan as seen in the above orders. The patient has received an after-visit summary and questions were answered concerning future plans.   I have discussed medication side effects and warnings with the patient as well. I have reviewed the plan of care with the patient, accepted their input and they are in agreement with the treatment goals. All questions were answered. The patient understands the plan of care. Handouts provided today with above information. Pt instructed if symptoms worsen to call the office or report to the ED for continued care. Greater than 50% of the visit time was spent in counseling and/or coordination of care. Voice recognition was used to generate this report, which may have resulted in some phonetic based errors in grammar and contents. Even though attempts were made to correct all the mistakes, some may have been missed, and remained in the body of the document.           Jemal David MD

## 2022-07-08 NOTE — TELEPHONE ENCOUNTER
LVM for patient, all prescriptions were sent to [facility] on [address] this morning. Will send negative results to the provider as well.

## 2022-07-08 NOTE — PROGRESS NOTES
Radha Bates presents today for   Chief Complaint   Patient presents with    Sore Throat     Right side x 3 days. Denies any fever or chills    Jaw Pain     right side         1. \"Have you been to the ER, urgent care clinic since your last visit? Hospitalized since your last visit? \" no    2. \"Have you seen or consulted any other health care providers outside of the [address] [place] [address] since your last visit? \" yes     3. For patients aged 39-70: Has the patient had a colonoscopy / FIT/ Cologuard? NA - based on age      If the patient is female:    4. For patients aged 41-77: Has the patient had a mammogram within the past 2 years? NA - based on age or sex  See top three    5. For patients aged 21-65: Has the patient had a pap smear?  Yes - no Care Gap present

## 2022-07-08 NOTE — TELEPHONE ENCOUNTER
----- Message from Pat Edmond sent at 7/8/2022  2:26 PM EDT -----  Subject: Message to Provider    QUESTIONS  Information for Provider? took a at home coivd test and it was negative   ---------------------------------------------------------------------------  --------------  [address]  [phone]; OK to leave message on voicemail  ---------------------------------------------------------------------------  --------------  SCRIPT ANSWERS  Relationship to Patient?  Self

## 2022-07-08 NOTE — TELEPHONE ENCOUNTER
----- Message from Best Alfaro sent at 7/8/2022  2:28 PM EDT -----  Subject: Message to Provider    QUESTIONS  Information for Provider? all meds should be sent to [facility] on [address]   in [place]  ---------------------------------------------------------------------------  --------------  Deannie Sandhoff INFO  [phone]; OK to leave message on voicemail  ---------------------------------------------------------------------------  --------------  SCRIPT ANSWERS  Relationship to Patient?  Self

## 2022-07-11 LAB — SARS-COV-2, NAA: NOT DETECTED

## 2022-09-21 ENCOUNTER — TELEPHONE (OUTPATIENT)
Dept: INTERNAL MEDICINE CLINIC | Age: 27
End: 2022-09-21

## 2022-09-21 DIAGNOSIS — J45.40 MODERATE PERSISTENT ASTHMA WITHOUT COMPLICATION: ICD-10-CM

## 2022-09-21 DIAGNOSIS — R06.02 SOB (SHORTNESS OF BREATH): Primary | ICD-10-CM

## 2022-09-21 RX ORDER — FLUTICASONE PROPIONATE 110 UG/1
2 AEROSOL, METERED RESPIRATORY (INHALATION) EVERY 12 HOURS
Qty: 1 EACH | Refills: 5 | Status: SHIPPED | OUTPATIENT
Start: 2022-09-21

## 2022-09-21 RX ORDER — ALBUTEROL SULFATE 90 UG/1
2 AEROSOL, METERED RESPIRATORY (INHALATION)
Qty: 1 EACH | Refills: 5 | Status: SHIPPED | OUTPATIENT
Start: 2022-09-21

## 2022-09-21 RX ORDER — COVID-19 ANTIGEN TEST
1 KIT MISCELLANEOUS ONCE
Qty: 1 EACH | Refills: 0 | Status: SHIPPED | OUTPATIENT
Start: 2022-09-21 | End: 2022-09-21

## 2022-09-21 NOTE — TELEPHONE ENCOUNTER
Please let her know that I refilled her inhalers and ordered the COVID test per her message. If she has worsening symptoms, please have her see an urgent care physician or contact our office so that she can be added to my schedule as a virtual visit this wk.     Dr. Shante Yates  [facility], [address] [place] [address], [address] [place] [address].  Phone: [phone]  Fax: [phone]

## 2022-09-21 NOTE — TELEPHONE ENCOUNTER
Pt  is asking if  can refill both her inhalers, one she uses [facility] and one for emergency she Is having issues with breathing ,and is unable to locate them - she said she is not sure if it is covid , she also said her pharmacy told her if she gets a Rx from her PCP for covid home test that her insurance would cover it